# Patient Record
Sex: MALE | Race: WHITE | NOT HISPANIC OR LATINO | Employment: OTHER | URBAN - METROPOLITAN AREA
[De-identification: names, ages, dates, MRNs, and addresses within clinical notes are randomized per-mention and may not be internally consistent; named-entity substitution may affect disease eponyms.]

---

## 2018-11-09 ENCOUNTER — APPOINTMENT (OUTPATIENT)
Dept: GENERAL RADIOLOGY | Facility: HOSPITAL | Age: 28
End: 2018-11-09

## 2018-11-09 ENCOUNTER — HOSPITAL ENCOUNTER (INPATIENT)
Facility: HOSPITAL | Age: 28
LOS: 5 days | Discharge: HOME | End: 2018-11-14
Attending: EMERGENCY MEDICINE | Admitting: SURGERY

## 2018-11-09 DIAGNOSIS — J93.9 PNEUMOTHORAX, LEFT: Primary | ICD-10-CM

## 2018-11-09 LAB
ALBUMIN SERPL-MCNC: 4.7 G/DL (ref 3.5–5.2)
ALBUMIN/GLOB SERPL: 1.4 G/DL
ALP SERPL-CCNC: 68 U/L (ref 40–129)
ALT SERPL W P-5'-P-CCNC: 25 U/L (ref 5–41)
ANION GAP SERPL CALCULATED.3IONS-SCNC: 12.7 MMOL/L
AST SERPL-CCNC: 18 U/L (ref 5–40)
BASOPHILS # BLD AUTO: 0.05 10*3/MM3 (ref 0–0.2)
BASOPHILS NFR BLD AUTO: 0.6 % (ref 0–2)
BILIRUB SERPL-MCNC: 0.4 MG/DL (ref 0.2–1.2)
BUN BLD-MCNC: 12 MG/DL (ref 6–20)
BUN/CREAT SERPL: 11.3 (ref 7–25)
CALCIUM SPEC-SCNC: 9.9 MG/DL (ref 8.6–10.5)
CHLORIDE SERPL-SCNC: 106 MMOL/L (ref 98–107)
CO2 SERPL-SCNC: 23.3 MMOL/L (ref 22–29)
CREAT BLD-MCNC: 1.06 MG/DL (ref 0.76–1.27)
DEPRECATED RDW RBC AUTO: 43.7 FL (ref 37–54)
EOSINOPHIL # BLD AUTO: 0.15 10*3/MM3 (ref 0.1–0.3)
EOSINOPHIL NFR BLD AUTO: 1.9 % (ref 0–4)
ERYTHROCYTE [DISTWIDTH] IN BLOOD BY AUTOMATED COUNT: 12.8 % (ref 11.5–14.5)
GFR SERPL CREATININE-BSD FRML MDRD: 100 ML/MIN/1.73
GFR SERPL CREATININE-BSD FRML MDRD: 83 ML/MIN/1.73
GLOBULIN UR ELPH-MCNC: 3.4 GM/DL
GLUCOSE BLD-MCNC: 84 MG/DL (ref 65–99)
HCT VFR BLD AUTO: 47.5 % (ref 42–52)
HGB BLD-MCNC: 16.1 G/DL (ref 14–18)
IMM GRANULOCYTES # BLD: 0.01 10*3/MM3 (ref 0–0.03)
IMM GRANULOCYTES NFR BLD: 0.1 % (ref 0–0.5)
LYMPHOCYTES # BLD AUTO: 2.82 10*3/MM3 (ref 0.6–4.8)
LYMPHOCYTES NFR BLD AUTO: 35.9 % (ref 20–45)
MCH RBC QN AUTO: 31.5 PG (ref 27–31)
MCHC RBC AUTO-ENTMCNC: 33.9 G/DL (ref 31–37)
MCV RBC AUTO: 93 FL (ref 80–94)
MONOCYTES # BLD AUTO: 0.54 10*3/MM3 (ref 0–1)
MONOCYTES NFR BLD AUTO: 6.9 % (ref 3–8)
NEUTROPHILS # BLD AUTO: 4.28 10*3/MM3 (ref 1.5–8.3)
NEUTROPHILS NFR BLD AUTO: 54.6 % (ref 45–70)
NRBC BLD MANUAL-RTO: 0 /100 WBC (ref 0–0)
PLATELET # BLD AUTO: 194 10*3/MM3 (ref 140–500)
PMV BLD AUTO: 10.9 FL (ref 7.4–10.4)
POTASSIUM BLD-SCNC: 3.8 MMOL/L (ref 3.5–5.2)
PROT SERPL-MCNC: 8.1 G/DL (ref 6–8.5)
RBC # BLD AUTO: 5.11 10*6/MM3 (ref 4.7–6.1)
SODIUM BLD-SCNC: 142 MMOL/L (ref 136–145)
WBC NRBC COR # BLD: 7.85 10*3/MM3 (ref 4.8–10.8)

## 2018-11-09 PROCEDURE — 25010000002 ONDANSETRON PER 1 MG: Performed by: EMERGENCY MEDICINE

## 2018-11-09 PROCEDURE — 71045 X-RAY EXAM CHEST 1 VIEW: CPT

## 2018-11-09 PROCEDURE — 99291 CRITICAL CARE FIRST HOUR: CPT | Performed by: EMERGENCY MEDICINE

## 2018-11-09 PROCEDURE — 25010000002 HYDROMORPHONE PER 4 MG: Performed by: SURGERY

## 2018-11-09 PROCEDURE — 80053 COMPREHEN METABOLIC PANEL: CPT | Performed by: EMERGENCY MEDICINE

## 2018-11-09 PROCEDURE — 25010000002 HYDROMORPHONE PER 4 MG

## 2018-11-09 PROCEDURE — 71046 X-RAY EXAM CHEST 2 VIEWS: CPT

## 2018-11-09 PROCEDURE — 32551 INSERTION OF CHEST TUBE: CPT | Performed by: EMERGENCY MEDICINE

## 2018-11-09 PROCEDURE — 99285 EMERGENCY DEPT VISIT HI MDM: CPT

## 2018-11-09 PROCEDURE — 25010000002 HYDROMORPHONE PER 4 MG: Performed by: EMERGENCY MEDICINE

## 2018-11-09 PROCEDURE — 94799 UNLISTED PULMONARY SVC/PX: CPT

## 2018-11-09 PROCEDURE — 85025 COMPLETE CBC W/AUTO DIFF WBC: CPT | Performed by: EMERGENCY MEDICINE

## 2018-11-09 PROCEDURE — 0W9B30Z DRAINAGE OF LEFT PLEURAL CAVITY WITH DRAINAGE DEVICE, PERCUTANEOUS APPROACH: ICD-10-PCS | Performed by: EMERGENCY MEDICINE

## 2018-11-09 RX ORDER — HYDROMORPHONE HCL 110MG/55ML
1 PATIENT CONTROLLED ANALGESIA SYRINGE INTRAVENOUS ONCE
Status: COMPLETED | OUTPATIENT
Start: 2018-11-09 | End: 2018-11-09

## 2018-11-09 RX ORDER — LIDOCAINE HYDROCHLORIDE 20 MG/ML
INJECTION, SOLUTION INFILTRATION; PERINEURAL
Status: COMPLETED
Start: 2018-11-09 | End: 2018-11-09

## 2018-11-09 RX ORDER — ONDANSETRON 2 MG/ML
4 INJECTION INTRAMUSCULAR; INTRAVENOUS ONCE
Status: COMPLETED | OUTPATIENT
Start: 2018-11-09 | End: 2018-11-09

## 2018-11-09 RX ORDER — LIDOCAINE HYDROCHLORIDE 20 MG/ML
10 INJECTION, SOLUTION INFILTRATION; PERINEURAL ONCE
Status: COMPLETED | OUTPATIENT
Start: 2018-11-09 | End: 2018-11-09

## 2018-11-09 RX ORDER — HYDROMORPHONE HCL 110MG/55ML
PATIENT CONTROLLED ANALGESIA SYRINGE INTRAVENOUS
Status: COMPLETED
Start: 2018-11-09 | End: 2018-11-09

## 2018-11-09 RX ORDER — SODIUM CHLORIDE 9 MG/ML
INJECTION, SOLUTION INTRAVENOUS
Status: COMPLETED
Start: 2018-11-09 | End: 2018-11-09

## 2018-11-09 RX ORDER — OXYCODONE HYDROCHLORIDE AND ACETAMINOPHEN 5; 325 MG/1; MG/1
1 TABLET ORAL EVERY 4 HOURS PRN
Status: DISCONTINUED | OUTPATIENT
Start: 2018-11-09 | End: 2018-11-10

## 2018-11-09 RX ORDER — HYDROMORPHONE HCL 110MG/55ML
0.5 PATIENT CONTROLLED ANALGESIA SYRINGE INTRAVENOUS
Status: DISCONTINUED | OUTPATIENT
Start: 2018-11-09 | End: 2018-11-10

## 2018-11-09 RX ADMIN — LIDOCAINE HYDROCHLORIDE 10 ML: 20 INJECTION, SOLUTION INFILTRATION; PERINEURAL at 19:54

## 2018-11-09 RX ADMIN — METHOHEXITAL SODIUM 50 MG: 500 INJECTION, POWDER, LYOPHILIZED, FOR SOLUTION INTRAMUSCULAR; INTRAVENOUS; RECTAL at 19:34

## 2018-11-09 RX ADMIN — ONDANSETRON 4 MG: 2 INJECTION, SOLUTION INTRAMUSCULAR; INTRAVENOUS at 19:53

## 2018-11-09 RX ADMIN — HYDROMORPHONE HYDROCHLORIDE 1 MG: 2 INJECTION, SOLUTION INTRAMUSCULAR; INTRAVENOUS; SUBCUTANEOUS at 19:36

## 2018-11-09 RX ADMIN — METHOHEXITAL SODIUM 30 MG: 500 INJECTION, POWDER, LYOPHILIZED, FOR SOLUTION INTRAMUSCULAR; INTRAVENOUS; RECTAL at 19:32

## 2018-11-09 RX ADMIN — Medication 1 MG: at 19:36

## 2018-11-09 RX ADMIN — HYDROMORPHONE HYDROCHLORIDE 0.5 MG: 2 INJECTION, SOLUTION INTRAMUSCULAR; INTRAVENOUS; SUBCUTANEOUS at 22:44

## 2018-11-09 RX ADMIN — METHOHEXITAL SODIUM 20 MG: 500 INJECTION, POWDER, LYOPHILIZED, FOR SOLUTION INTRAMUSCULAR; INTRAVENOUS; RECTAL at 19:30

## 2018-11-09 RX ADMIN — HYDROMORPHONE HYDROCHLORIDE 1 MG: 2 INJECTION, SOLUTION INTRAMUSCULAR; INTRAVENOUS; SUBCUTANEOUS at 20:26

## 2018-11-09 RX ADMIN — METHOHEXITAL SODIUM 70 MG: 500 INJECTION, POWDER, LYOPHILIZED, FOR SOLUTION INTRAMUSCULAR; INTRAVENOUS; RECTAL at 19:29

## 2018-11-10 ENCOUNTER — ANESTHESIA EVENT (OUTPATIENT)
Dept: PERIOP | Facility: HOSPITAL | Age: 28
End: 2018-11-10

## 2018-11-10 ENCOUNTER — APPOINTMENT (OUTPATIENT)
Dept: GENERAL RADIOLOGY | Facility: HOSPITAL | Age: 28
End: 2018-11-10

## 2018-11-10 ENCOUNTER — ANESTHESIA (OUTPATIENT)
Dept: PERIOP | Facility: HOSPITAL | Age: 28
End: 2018-11-10

## 2018-11-10 PROBLEM — J93.9 PNEUMOTHORAX, LEFT: Status: ACTIVE | Noted: 2018-11-10

## 2018-11-10 PROCEDURE — 87486 CHLMYD PNEUM DNA AMP PROBE: CPT | Performed by: HOSPITALIST

## 2018-11-10 PROCEDURE — 25010000002 SUCCINYLCHOLINE PER 20 MG: Performed by: NURSE ANESTHETIST, CERTIFIED REGISTERED

## 2018-11-10 PROCEDURE — 99252 IP/OBS CONSLTJ NEW/EST SF 35: CPT | Performed by: HOSPITALIST

## 2018-11-10 PROCEDURE — 25010000002 MIDAZOLAM PER 1 MG: Performed by: NURSE ANESTHETIST, CERTIFIED REGISTERED

## 2018-11-10 PROCEDURE — 32551 INSERTION OF CHEST TUBE: CPT | Performed by: SURGERY

## 2018-11-10 PROCEDURE — 25010000002 ONDANSETRON PER 1 MG: Performed by: NURSE ANESTHETIST, CERTIFIED REGISTERED

## 2018-11-10 PROCEDURE — C1729 CATH, DRAINAGE: HCPCS | Performed by: SURGERY

## 2018-11-10 PROCEDURE — 0W9B00Z DRAINAGE OF LEFT PLEURAL CAVITY WITH DRAINAGE DEVICE, OPEN APPROACH: ICD-10-PCS | Performed by: SURGERY

## 2018-11-10 PROCEDURE — 71046 X-RAY EXAM CHEST 2 VIEWS: CPT

## 2018-11-10 PROCEDURE — 87581 M.PNEUMON DNA AMP PROBE: CPT | Performed by: HOSPITALIST

## 2018-11-10 PROCEDURE — 25010000002 HYDROMORPHONE PER 4 MG: Performed by: SURGERY

## 2018-11-10 PROCEDURE — 63710000001 DIPHENHYDRAMINE PER 50 MG

## 2018-11-10 PROCEDURE — 25010000002 PROPOFOL 10 MG/ML EMULSION: Performed by: NURSE ANESTHETIST, CERTIFIED REGISTERED

## 2018-11-10 PROCEDURE — 94799 UNLISTED PULMONARY SVC/PX: CPT

## 2018-11-10 PROCEDURE — 25010000002 FENTANYL CITRATE (PF) 100 MCG/2ML SOLUTION: Performed by: NURSE ANESTHETIST, CERTIFIED REGISTERED

## 2018-11-10 PROCEDURE — 71045 X-RAY EXAM CHEST 1 VIEW: CPT

## 2018-11-10 PROCEDURE — 87798 DETECT AGENT NOS DNA AMP: CPT | Performed by: HOSPITALIST

## 2018-11-10 PROCEDURE — 99223 1ST HOSP IP/OBS HIGH 75: CPT | Performed by: SURGERY

## 2018-11-10 PROCEDURE — 87633 RESP VIRUS 12-25 TARGETS: CPT | Performed by: HOSPITALIST

## 2018-11-10 PROCEDURE — 25010000002 PROMETHAZINE PER 50 MG: Performed by: SURGERY

## 2018-11-10 PROCEDURE — 25010000002 PROMETHAZINE PER 50 MG

## 2018-11-10 PROCEDURE — 25010000002 HYDROMORPHONE 1 MG/ML SOLUTION: Performed by: NURSE ANESTHETIST, CERTIFIED REGISTERED

## 2018-11-10 RX ORDER — NICOTINE 21 MG/24HR
PATCH, TRANSDERMAL 24 HOURS TRANSDERMAL
Status: DISPENSED
Start: 2018-11-10 | End: 2018-11-11

## 2018-11-10 RX ORDER — NICOTINE 21 MG/24HR
1 PATCH, TRANSDERMAL 24 HOURS TRANSDERMAL
Status: DISCONTINUED | OUTPATIENT
Start: 2018-11-10 | End: 2018-11-14 | Stop reason: HOSPADM

## 2018-11-10 RX ORDER — PROMETHAZINE HYDROCHLORIDE 25 MG/ML
INJECTION, SOLUTION INTRAMUSCULAR; INTRAVENOUS
Status: DISPENSED
Start: 2018-11-10 | End: 2018-11-11

## 2018-11-10 RX ORDER — PROPOFOL 10 MG/ML
VIAL (ML) INTRAVENOUS AS NEEDED
Status: DISCONTINUED | OUTPATIENT
Start: 2018-11-10 | End: 2018-11-10 | Stop reason: SURG

## 2018-11-10 RX ORDER — SODIUM CHLORIDE, SODIUM LACTATE, POTASSIUM CHLORIDE, CALCIUM CHLORIDE 600; 310; 30; 20 MG/100ML; MG/100ML; MG/100ML; MG/100ML
INJECTION, SOLUTION INTRAVENOUS CONTINUOUS PRN
Status: DISCONTINUED | OUTPATIENT
Start: 2018-11-10 | End: 2018-11-10 | Stop reason: SURG

## 2018-11-10 RX ORDER — MAGNESIUM HYDROXIDE 1200 MG/15ML
LIQUID ORAL AS NEEDED
Status: DISCONTINUED | OUTPATIENT
Start: 2018-11-10 | End: 2018-11-10 | Stop reason: HOSPADM

## 2018-11-10 RX ORDER — SODIUM CHLORIDE 0.9 % (FLUSH) 0.9 %
3 SYRINGE (ML) INJECTION EVERY 12 HOURS SCHEDULED
Status: DISCONTINUED | OUTPATIENT
Start: 2018-11-10 | End: 2018-11-14 | Stop reason: HOSPADM

## 2018-11-10 RX ORDER — SUCCINYLCHOLINE CHLORIDE 20 MG/ML
INJECTION INTRAMUSCULAR; INTRAVENOUS AS NEEDED
Status: DISCONTINUED | OUTPATIENT
Start: 2018-11-10 | End: 2018-11-10 | Stop reason: SURG

## 2018-11-10 RX ORDER — LIDOCAINE HYDROCHLORIDE 20 MG/ML
INJECTION, SOLUTION INFILTRATION; PERINEURAL AS NEEDED
Status: DISCONTINUED | OUTPATIENT
Start: 2018-11-10 | End: 2018-11-10 | Stop reason: SURG

## 2018-11-10 RX ORDER — ONDANSETRON 2 MG/ML
4 INJECTION INTRAMUSCULAR; INTRAVENOUS ONCE AS NEEDED
Status: DISCONTINUED | OUTPATIENT
Start: 2018-11-10 | End: 2018-11-10

## 2018-11-10 RX ORDER — FENTANYL CITRATE 50 UG/ML
INJECTION, SOLUTION INTRAMUSCULAR; INTRAVENOUS AS NEEDED
Status: DISCONTINUED | OUTPATIENT
Start: 2018-11-10 | End: 2018-11-10 | Stop reason: SURG

## 2018-11-10 RX ORDER — OXYCODONE HYDROCHLORIDE AND ACETAMINOPHEN 5; 325 MG/1; MG/1
1 TABLET ORAL EVERY 4 HOURS PRN
Status: DISCONTINUED | OUTPATIENT
Start: 2018-11-10 | End: 2018-11-10

## 2018-11-10 RX ORDER — HYDROMORPHONE HCL 110MG/55ML
1 PATIENT CONTROLLED ANALGESIA SYRINGE INTRAVENOUS
Status: DISCONTINUED | OUTPATIENT
Start: 2018-11-10 | End: 2018-11-13

## 2018-11-10 RX ORDER — HYDROMORPHONE HCL 110MG/55ML
0.5 PATIENT CONTROLLED ANALGESIA SYRINGE INTRAVENOUS
Status: DISCONTINUED | OUTPATIENT
Start: 2018-11-10 | End: 2018-11-13

## 2018-11-10 RX ORDER — SODIUM CHLORIDE 0.9 % (FLUSH) 0.9 %
3-10 SYRINGE (ML) INJECTION AS NEEDED
Status: DISCONTINUED | OUTPATIENT
Start: 2018-11-10 | End: 2018-11-14 | Stop reason: HOSPADM

## 2018-11-10 RX ORDER — SODIUM CHLORIDE, SODIUM LACTATE, POTASSIUM CHLORIDE, CALCIUM CHLORIDE 600; 310; 30; 20 MG/100ML; MG/100ML; MG/100ML; MG/100ML
100 INJECTION, SOLUTION INTRAVENOUS CONTINUOUS
Status: DISCONTINUED | OUTPATIENT
Start: 2018-11-10 | End: 2018-11-11

## 2018-11-10 RX ORDER — PROMETHAZINE HYDROCHLORIDE 25 MG/ML
INJECTION, SOLUTION INTRAMUSCULAR; INTRAVENOUS
Status: COMPLETED
Start: 2018-11-10 | End: 2018-11-10

## 2018-11-10 RX ORDER — HYDROMORPHONE HCL 110MG/55ML
2 PATIENT CONTROLLED ANALGESIA SYRINGE INTRAVENOUS
Status: DISCONTINUED | OUTPATIENT
Start: 2018-11-10 | End: 2018-11-13

## 2018-11-10 RX ORDER — MIDAZOLAM HYDROCHLORIDE 1 MG/ML
INJECTION INTRAMUSCULAR; INTRAVENOUS AS NEEDED
Status: DISCONTINUED | OUTPATIENT
Start: 2018-11-10 | End: 2018-11-10 | Stop reason: SURG

## 2018-11-10 RX ORDER — OXYCODONE HYDROCHLORIDE AND ACETAMINOPHEN 5; 325 MG/1; MG/1
2 TABLET ORAL EVERY 4 HOURS PRN
Status: DISCONTINUED | OUTPATIENT
Start: 2018-11-10 | End: 2018-11-14 | Stop reason: HOSPADM

## 2018-11-10 RX ORDER — OXYCODONE HYDROCHLORIDE AND ACETAMINOPHEN 5; 325 MG/1; MG/1
1 TABLET ORAL ONCE
Status: COMPLETED | OUTPATIENT
Start: 2018-11-10 | End: 2018-11-10

## 2018-11-10 RX ORDER — OXYCODONE HYDROCHLORIDE AND ACETAMINOPHEN 5; 325 MG/1; MG/1
1 TABLET ORAL EVERY 4 HOURS PRN
Status: DISCONTINUED | OUTPATIENT
Start: 2018-11-10 | End: 2018-11-14 | Stop reason: HOSPADM

## 2018-11-10 RX ORDER — ONDANSETRON 2 MG/ML
INJECTION INTRAMUSCULAR; INTRAVENOUS AS NEEDED
Status: DISCONTINUED | OUTPATIENT
Start: 2018-11-10 | End: 2018-11-10 | Stop reason: SURG

## 2018-11-10 RX ORDER — DIPHENHYDRAMINE HCL 25 MG
CAPSULE ORAL
Status: COMPLETED
Start: 2018-11-10 | End: 2018-11-10

## 2018-11-10 RX ADMIN — PROMETHAZINE HYDROCHLORIDE 12.5 MG: 25 INJECTION INTRAMUSCULAR; INTRAVENOUS at 23:08

## 2018-11-10 RX ADMIN — SUCCINYLCHOLINE CHLORIDE 200 MG: 20 INJECTION, SOLUTION INTRAMUSCULAR; INTRAVENOUS at 10:54

## 2018-11-10 RX ADMIN — OXYCODONE HYDROCHLORIDE AND ACETAMINOPHEN 1 TABLET: 5; 325 TABLET ORAL at 18:41

## 2018-11-10 RX ADMIN — HYDROMORPHONE HYDROCHLORIDE 1 MG: 2 INJECTION, SOLUTION INTRAMUSCULAR; INTRAVENOUS; SUBCUTANEOUS at 14:30

## 2018-11-10 RX ADMIN — OXYCODONE HYDROCHLORIDE AND ACETAMINOPHEN 2 TABLET: 5; 325 TABLET ORAL at 22:33

## 2018-11-10 RX ADMIN — SODIUM CHLORIDE, PRESERVATIVE FREE 12.5 MG: 5 INJECTION INTRAVENOUS at 18:32

## 2018-11-10 RX ADMIN — NICOTINE 1 PATCH: 21 PATCH TRANSDERMAL at 16:31

## 2018-11-10 RX ADMIN — HYDROMORPHONE HYDROCHLORIDE 0.5 MG: 2 INJECTION, SOLUTION INTRAMUSCULAR; INTRAVENOUS; SUBCUTANEOUS at 03:57

## 2018-11-10 RX ADMIN — FENTANYL CITRATE 100 MCG: 50 INJECTION, SOLUTION INTRAMUSCULAR; INTRAVENOUS at 10:49

## 2018-11-10 RX ADMIN — HYDROMORPHONE HYDROCHLORIDE 0.5 MG: 2 INJECTION, SOLUTION INTRAMUSCULAR; INTRAVENOUS; SUBCUTANEOUS at 00:42

## 2018-11-10 RX ADMIN — SODIUM CHLORIDE, POTASSIUM CHLORIDE, SODIUM LACTATE AND CALCIUM CHLORIDE 100 ML/HR: 600; 310; 30; 20 INJECTION, SOLUTION INTRAVENOUS at 12:42

## 2018-11-10 RX ADMIN — MIDAZOLAM HYDROCHLORIDE 2 MG: 1 INJECTION, SOLUTION INTRAMUSCULAR; INTRAVENOUS at 10:44

## 2018-11-10 RX ADMIN — HYDROMORPHONE HYDROCHLORIDE 1 MG: 2 INJECTION, SOLUTION INTRAMUSCULAR; INTRAVENOUS; SUBCUTANEOUS at 21:21

## 2018-11-10 RX ADMIN — DIPHENHYDRAMINE HYDROCHLORIDE 25 MG: 25 CAPSULE ORAL at 21:21

## 2018-11-10 RX ADMIN — SODIUM CHLORIDE, POTASSIUM CHLORIDE, SODIUM LACTATE AND CALCIUM CHLORIDE 100 ML/HR: 600; 310; 30; 20 INJECTION, SOLUTION INTRAVENOUS at 16:05

## 2018-11-10 RX ADMIN — FENTANYL CITRATE 100 MCG: 50 INJECTION, SOLUTION INTRAMUSCULAR; INTRAVENOUS at 11:49

## 2018-11-10 RX ADMIN — SODIUM CHLORIDE, PRESERVATIVE FREE 12.5 MG: 5 INJECTION INTRAVENOUS at 22:30

## 2018-11-10 RX ADMIN — HYDROMORPHONE HYDROCHLORIDE 1 MG: 2 INJECTION, SOLUTION INTRAMUSCULAR; INTRAVENOUS; SUBCUTANEOUS at 17:03

## 2018-11-10 RX ADMIN — OXYCODONE HYDROCHLORIDE AND ACETAMINOPHEN 1 TABLET: 5; 325 TABLET ORAL at 07:56

## 2018-11-10 RX ADMIN — ONDANSETRON 4 MG: 2 INJECTION, SOLUTION INTRAMUSCULAR; INTRAVENOUS at 10:44

## 2018-11-10 RX ADMIN — HYDROMORPHONE HYDROCHLORIDE 1 MG: 2 INJECTION, SOLUTION INTRAMUSCULAR; INTRAVENOUS; SUBCUTANEOUS at 19:47

## 2018-11-10 RX ADMIN — OXYCODONE HYDROCHLORIDE AND ACETAMINOPHEN 1 TABLET: 5; 325 TABLET ORAL at 16:04

## 2018-11-10 RX ADMIN — HYDROMORPHONE HYDROCHLORIDE 0.5 MG: 2 INJECTION, SOLUTION INTRAMUSCULAR; INTRAVENOUS; SUBCUTANEOUS at 06:54

## 2018-11-10 RX ADMIN — LIDOCAINE HYDROCHLORIDE 100 MG: 20 INJECTION, SOLUTION INFILTRATION; PERINEURAL at 10:49

## 2018-11-10 RX ADMIN — HYDROMORPHONE HYDROCHLORIDE 0.5 MG: 1 INJECTION, SOLUTION INTRAMUSCULAR; INTRAVENOUS; SUBCUTANEOUS at 12:00

## 2018-11-10 RX ADMIN — PROPOFOL 150 MG: 10 INJECTION, EMULSION INTRAVENOUS at 10:54

## 2018-11-10 RX ADMIN — Medication 3 ML: at 14:30

## 2018-11-10 RX ADMIN — SODIUM CHLORIDE, POTASSIUM CHLORIDE, SODIUM LACTATE AND CALCIUM CHLORIDE: 600; 310; 30; 20 INJECTION, SOLUTION INTRAVENOUS at 10:47

## 2018-11-10 NOTE — H&P
"Left chest pain  History of present illness this 28-year-old white male awoke yesterday with left-sided chest pain and he was unable to lay flat.  He came to the emergency room was found to have a left pneumothorax.  Chest tube was placed by the emergency room doctor and I was asked to admit him.  Initially the chest film showed no nearly complete reexpansion with a 5% residual but now it shows an increasing left-sided pneumothorax.  Denies prior history of pneumothorax.  He smokes a pack of cigarettes per day.   past medical history significant for prior history of asthma.  He has had dental procedures in the past.  He denies any medications at home.  He denies any allergies to medications.  Social history drinks alcohol socially and he smokes a pack of cigarettes per day  Family history significant for diabetes  There are no disabilities  Review of systems is otherwise negative  Physical exam the cell alert white male in no active distress.  He has bilateral wheezing.  His left Pleur-evac shows a continuous air leak.  Heart shows regular rate and rhythm his abdomen is soft and nontender his lab values are normal his chest x-ray shows a 10% left-sided pneumothorax with the tube having shifted from its prior position it is now more laterally.  The chest tube is not sutured to the chest wall.  /96 (BP Location: Right arm)   Pulse 60   Temp 97.4 °F (36.3 °C) (Oral)   Resp 16   Ht 175.3 cm (69\")   Wt 81.6 kg (180 lb)   SpO2 98%   BMI 26.58 kg/m²     Assessment left-sided spontaneous pneumothorax with chest tube malposition  Plan the risks benefits and options were discussed with the patient in detail we will proceed with replacement of his left chest tube.  "

## 2018-11-10 NOTE — ANESTHESIA PROCEDURE NOTES
ANESTHESIA INTUBATION  Urgency: elective    Date/Time: 11/10/2018 10:51 AM  End Time:11/10/2018 10:51 AM  Airway not difficult    General Information and Staff    Patient location during procedure: OR  CRNA: Kimber Renteria CRNA    Indications and Patient Condition  Indications for airway management: airway protection    Preoxygenated: yes  MILS maintained throughout  Mask difficulty assessment: 0 - not attempted (RSI FOR EMERGENT CASE)    Final Airway Details  Final airway type: endotracheal airway      Successful airway: ETT  Cuffed: yes   Successful intubation technique: direct laryngoscopy  Facilitating devices/methods: intubating stylet and cricoid pressure  Endotracheal tube insertion site: oral  Blade: Briceño  Blade size: 2  ETT size (mm): 7.5  Cormack-Lehane Classification: grade I - full view of glottis  Placement verified by: chest auscultation and capnometry   Measured from: lips  ETT to lips (cm): 22  Number of attempts at approach: 1    Additional Comments  BEBS, +ETCO2, VSS. TEETH AND GUMS AS PRE-OP.

## 2018-11-10 NOTE — ED NOTES
Dr Rico, Liane Alvarez RT, Nikki Mgchee ERT, Kemi Campbell ERT, Amirah Ivey RN, Shelia Keane RN at bedside.       Shelia Montiel, RN  11/09/18 1959

## 2018-11-10 NOTE — NURSING NOTE
"Discharge Planning Assessment   [facility]     Patient Name: Santy Maxwell  MRN: [id number]  Today's Date: 11/10/2018    Admit Date: 11/9/2018    Discharge Needs Assessment     Row Name 11/10/18 9636       Living Environment    Lives With  significant other;child(terrance), dependent;grandparent(s)    Current Living Arrangements  home/apartment/condo    Primary Care Provided by  self    Provides Primary Care For  child(terrance)    Family Caregiver if Needed  significant other    Quality of Family Relationships  helpful;involved;supportive       Resource/Environmental Concerns    Resource/Environmental Concerns  financial    Financial Concerns  insurance, none    Transportation Concerns  car, none       Transition Planning    Patient/Family Anticipates Transition to  home with family    Patient/Family Anticipated Services at Transition  none    Transportation Anticipated  car, drives self;family or friend will provide       Discharge Needs Assessment    Readmission Within the Last 30 Days  no previous admission in last 30 days    Concerns to be Addressed  financial/insurance    Equipment Currently Used at Home  none    Anticipated Changes Related to Illness  inability to work    Equipment Needed After Discharge  none    Offered/Gave Vendor List  other (see comments) PCP list given        Discharge Plan     Row Name 11/10/18 5585       Plan    Plan  home    Patient/Family in Agreement with Plan  yes    Plan Comments  spoke with patient at bedside. agreeable to speak to . he lives at home with sig other, grandmother & 4 children (ages 9,5,4,1). they live in a one level modular home. no HH, DME or home O2/neb. independent with ADL's including shopping, meal prep and driving. he works full time but describes it as \"self employed\". he does not have medical insurance. LVM for [facility]-med assist r/t self pay. will use [facility] if any medications are needed. he does not have prescription coverage and will need meds " on low cost list. does not have a living will: pamphlet given and cosult placed for [facility]. no PCP listed and list of providers given. face sheet verified. plan is home when medically stable. will continue to follow.         Destination      No service coordination in this encounter.      Durable Medical Equipment      No service coordination in this encounter.      Dialysis/Infusion      No service coordination in this encounter.      Home Medical Care      No service coordination in this encounter.      Community Resources      No service coordination in this encounter.          Demographic Summary     Row Name 11/10/18 0948       General Information    Admission Type  inpatient    Arrived From  home    Referral Source  admission list    Reason for Consult  discharge planning    Preferred Language  English     Used During This Interaction  no       Contact Information    Permission Granted to Share Info With          Functional Status    No documentation.       Psychosocial    No documentation.       Abuse/Neglect    No documentation.       Legal    No documentation.       Substance Abuse    No documentation.       Patient Forms    No documentation.           Emely Chris RN

## 2018-11-10 NOTE — PLAN OF CARE
Problem: Chest Tube Drainage Device (Adult)  Goal: Signs and Symptoms of Listed Potential Problems Will be Absent, Minimized or Managed (Chest Tube Drainage Device)  Outcome: Ongoing (interventions implemented as appropriate)   11/10/18 8778   Goal/Outcome Evaluation   Problems Assessed (Chest Tube Drainage Device) infection;pain

## 2018-11-10 NOTE — PLAN OF CARE
Problem: Patient Care Overview  Goal: Plan of Care Review  Outcome: Outcome(s) achieved Date Met: 11/10/18   11/10/18 9540   Coping/Psychosocial   Plan of Care Reviewed With patient   Plan of Care Review   Progress improving   OTHER   Outcome Summary Pt had new chest tube placed today by Dr Prabhakar-continues on LWS (SEE ORDER), prn pain medicine administerd as needed, encourage pt to ambulate and cough and deep breathe, will continue to monitor pt for changes

## 2018-11-10 NOTE — ED NOTES
Pt moved to trauma room to prepare for chest tube insertion.  Consent signed.     Shelia Montiel RN  11/09/18 9074

## 2018-11-10 NOTE — PLAN OF CARE
Problem: Patient Care Overview  Goal: Discharge Needs Assessment  Outcome: Ongoing (interventions implemented as appropriate)   11/10/18 0949   Discharge Needs Assessment   Readmission Within the Last 30 Days no previous admission in last 30 days   Concerns to be Addressed financial/insurance   Patient/Family Anticipates Transition to home with family   Patient/Family Anticipated Services at Transition none   Transportation Concerns car, none   Transportation Anticipated car, drives self;family or friend will provide   Anticipated Changes Related to Illness inability to work   Equipment Needed After Discharge none   Offered/Gave Vendor List other (see comments)  (PCP list given)   Disability   Equipment Currently Used at Home none

## 2018-11-10 NOTE — PLAN OF CARE
Problem: Pain, Acute (Adult)  Goal: Identify Related Risk Factors and Signs and Symptoms  Outcome: Outcome(s) achieved Date Met: 11/10/18   11/10/18 8476   Pain, Acute (Adult)   Related Risk Factors (Acute Pain) positioning;procedure/treatment;patient perception   Signs and Symptoms (Acute Pain) verbalization of pain descriptors

## 2018-11-10 NOTE — PLAN OF CARE
Problem: Infection, Risk/Actual (Adult)  Goal: Identify Related Risk Factors and Signs and Symptoms  Outcome: Outcome(s) achieved Date Met: 11/10/18    Goal: Infection Prevention/Resolution  Outcome: Ongoing (interventions implemented as appropriate)   11/10/18 0453   Infection, Risk/Actual (Adult)   Infection Prevention/Resolution making progress toward outcome

## 2018-11-10 NOTE — OP NOTE
Preoperative diagnosis left pneumothorax with malpositioned chest tube  Postoperative diagnosis is same  Procedure insertion of left chest tube  Complications none  Drains none  Specimen none  Estimated blood loss less than 5 cc  Anesthesia Gen. via endotracheal tube  Surgeon Dr. Prabhakar  Assistant none  Findings left chest tube appeared to be well positioned at the conclusion on emergence from anesthesia the patient became somewhat combative that chest tube dressing was taken down and appeared to be intact but we will check another chest x-ray.  Procedure after satisfactory induction of general anesthesia via endotracheal tube the patient's left chest was prepped and draped in sterile fashion.  His prior chest tube had come out completely.  Incision is made in the anterior axillary line overlying the sixth rib and skin subcutaneous tissue was divided hemostasis was assured Lauren clamp was passed over the top the rib and into the pleural cavity 20 Czech chest tube was passed into the left chest.  There was condensation on the inside of the tube confirming the tube within the left pleural space.  It was sutured in place.  He was hooked to a Pleur-evac.  Chest x-ray in the operating room  showed that the tube could be directed more towards the apex so the tube was subsequently readjusted  final chest x-ray showed the tube be in acceptable position.    The incision for this insertion was closed in interrupted simple fashion with use of 0 silk and the chest tube was sutured in the chest wall with 0 silk.  The previous chest tube insertion site was closed in interrupted simple fashion with use of 2-0 nylon.  Wound was cleaned and dried and sterilely dressed with Vaseline gauze 4 x 4's and tape, all connections were secured the chest tube was hooked to low continuous wall suction 20 cm underwater seal .upon emergence from anesthesia he became somewhat combative.  The chest tube dressing was taken down and the chest tube did  not appear to have moved it was redressed and chest x-ray will be obtained in the recovery room.  That chest x-ray is pending.  He tolerated the procedure well and was transferred to the recovery room in stable condition.

## 2018-11-10 NOTE — ED PROVIDER NOTES
Subjective   History of Present Illness  History of Present Illness    Chief complaint: Shortness of breath and left-sided chest pain    Location: Entire left chest    Quality/Severity:  Moderate    Timing/Duration: Symptoms began this morning    Modifying Factors: Symptoms started after a coughing spell    Associated Symptoms: Anxious    Narrative: The patient is a 28-year-old white male who presents as noted above.  The patient relates that after getting up this morning he did have a coughing spell.  Subsequently the patient began to develop some left-sided chest pain along with increasing shortness of breath.  Symptoms are now much worse.    Review of Systems   Unable to perform ROS: Acuity of condition (Limited due to acuity of condition.)   Respiratory: Positive for cough and shortness of breath.    Cardiovascular: Positive for chest pain (left side).       Past Medical History:   Diagnosis Date   • Asthma    • Hypertension        No Known Allergies    Past Surgical History:   Procedure Laterality Date   • DENTAL PROCEDURE         History reviewed. No pertinent family history.    Social History     Social History   • Marital status: Significant Other     Social History Main Topics   • Smoking status: Heavy Tobacco Smoker     Packs/day: 1.00     Types: Cigarettes   • Alcohol use No   • Drug use: No     Other Topics Concern   • Not on file           Objective   Physical Exam   Constitutional: He is oriented to person, place, and time.   The patient is a healthy-appearing, 28-year-old white male who is in significant distress due to shortness of breath and pain.   HENT:   Head: Normocephalic and atraumatic.   Eyes: Conjunctivae and EOM are normal.   Cardiovascular: Normal rate, regular rhythm and normal heart sounds.    Pulmonary/Chest:   Tachypnea present.  Auscultation of the lungs reveals markedly decreased breath sounds on the left side.  The left chest is tender to palpation.   Abdominal: Soft. There is no  tenderness.   Neurological: He is alert and oriented to person, place, and time.   Psychiatric:   Patient very anxious and tremulous.   Nursing note and vitals reviewed.      Chest Tube Insertion  Date/Time: 11/9/2018 7:55 PM  Performed by: PAL RICO  Authorized by: PAL RICO     Consent:     Consent obtained:  Verbal and written    Consent given by:  Patient    Risks discussed:  Bleeding, pain and infection  Pre-procedure details:     Skin preparation:  Betadine  Sedation:     Sedation type:  Moderate (conscious) sedation  Anesthesia (see MAR for exact dosages):     Anesthesia method:  Local infiltration    Local anesthetic:  Lidocaine 2% w/o epi  Procedure details:     Placement location:  L lateral    Scalpel size:  10    Tube size (Fr):  12    Dissection instrument:  Finger    Ultrasound guidance: no      Tension pneumothorax: no      Tube connected to:  Water seal    Drainage characteristics:  Air only    Dressing:  4x4 sterile gauze and petrolatum-impregnated gauze  Post-procedure details:     Post-insertion x-ray findings: tube in good position      Patient tolerance of procedure:  Tolerated well, no immediate complications  Comments:      See nurses notes for medications.  Chest tube insertion was felt to be emergent.  Some mild difficulty was experienced in achieving adequate sedation.               ED Course  ED Course as of Nov 09 2037 Fri Nov 09, 2018   1916 Large pneumothorax with some mediastinal shift noted on chest x-ray.  Findings discussed with patient and he is agreeable to chest tube insertion.  Preparations for procedure in process.  [ML]   2034 Case and findings discussed with the general surgeon, Dr. Prabhakar, who did agree to admit this patient for further treatment of his left-sided spontaneous pneumothorax.  Patient in agreement with treatment plan.  [ML]      ED Course User Index  [ML] Pal Rico MD                  [facility]  Number of Diagnoses or Management  Options  Pneumothorax, left: new and requires workup     Amount and/or Complexity of Data Reviewed  Clinical lab tests: reviewed and ordered  Tests in the radiology section of CPT®: ordered and reviewed  Discuss the patient with other providers: yes  Independent visualization of images, tracings, or specimens: yes    Risk of Complications, Morbidity, and/or Mortality  Presenting problems: high  Diagnostic procedures: high  Management options: high    Critical Care  Total time providing critical care: 30-74 minutes    Labs this visit  Lab Results (last 24 hours)     Procedure Component Value Units Date/Time    CBC & Differential [[id number]] Collected:  11/09/18 1848    Specimen:  Blood Updated:  11/09/18 1902    Narrative:       The following orders were created for panel order CBC & Differential.  Procedure                               Abnormality         Status                     ---------                               -----------         ------                     CBC Auto Differential[[id number]]        Abnormal            Final result                 Please view results for these tests on the individual orders.    Comprehensive Metabolic Panel [[id number]] Collected:  11/09/18 1848    Specimen:  Blood Updated:  11/09/18 1918     Glucose 84 mg/dL      BUN 12 mg/dL      Creatinine 1.06 mg/dL      Sodium 142 mmol/L      Potassium 3.8 mmol/L      Chloride 106 mmol/L      CO2 23.3 mmol/L      Calcium 9.9 mg/dL      Total Protein 8.1 g/dL      Albumin 4.70 g/dL      ALT (SGPT) 25 U/L      AST (SGOT) 18 U/L      Alkaline Phosphatase 68 U/L      Total Bilirubin 0.4 mg/dL      eGFR Non African Amer 83 mL/min/1.73      eGFR  African Amer 100 mL/min/1.73      Globulin 3.4 gm/dL      A/G Ratio 1.4 g/dL      BUN/Creatinine Ratio 11.3     Anion Gap 12.7 mmol/L     CBC Auto Differential [[id number]]  (Abnormal) Collected:  11/09/18 1848    Specimen:  Blood Updated:  11/09/18 1902     WBC 7.85 10*3/mm3      RBC 5.11 10*6/mm3       Hemoglobin 16.1 g/dL      Hematocrit 47.5 %      MCV 93.0 fL      MCH 31.5 (H) pg      MCHC 33.9 g/dL      RDW 12.8 %      RDW-SD 43.7 fl      MPV 10.9 (H) fL      Platelets 194 10*3/mm3      Neutrophil % 54.6 %      Lymphocyte % 35.9 %      Monocyte % 6.9 %      Eosinophil % 1.9 %      Basophil % 0.6 %      Immature Grans % 0.1 %      Neutrophils, Absolute 4.28 10*3/mm3      Lymphocytes, Absolute 2.82 10*3/mm3      Monocytes, Absolute 0.54 10*3/mm3      Eosinophils, Absolute 0.15 10*3/mm3      Basophils, Absolute 0.05 10*3/mm3      Immature Grans, Absolute 0.01 10*3/mm3      nRBC 0.0 /100 WBC         Prescribed on discharge             Medication List      No changes were made to your prescriptions during this visit.       All lab results, imaging results and other tests were reviewed by Kong Rico MD and unless otherwise specified were found to be unremarkable.      Final diagnoses:   Pneumothorax, left         Final diagnoses:   Pneumothorax, left          Kong Rico MD  11/09/18 2039

## 2018-11-10 NOTE — CONSULTS
"[facility] HOSPITALIST     Provider, No Known    CHIEF COMPLAINT: pneumothorax and pain left chest wall/ Managed by surgery    Asthma and history of wheezing/  Will monitor    HISTORY OF PRESENT ILLNESS:    Patient tells us that he woke up with chest pain and it would not get better.  At first he thought it may be his heart but he thought the pain was too sharp and hurt too bad.  So he came to the ER and pneumothorax was diagnosed.  Patient does not have a respiratory infection but he has been sneezing and had nasal congestion.   He is a smoker and we discussed how important it is for him to quit smoking.    He also has hypertension.  His blood pressure presently is 139/97.  That is much better.  Earlier it was 162/120 but I feel pain was driving a lot of the elevation in blood pressure.  We will monitor      Past Medical History:   Diagnosis Date   • Asthma    • Hypertension      Past Surgical History:   Procedure Laterality Date   • DENTAL PROCEDURE       History reviewed. No pertinent family history.  Social History     Tobacco Use   • Smoking status: Heavy Tobacco Smoker     Packs/day: 1.00     Types: Cigarettes   • Smokeless tobacco: Former User     Types: Chew   Substance Use Topics   • Alcohol use: No   • Drug use: No     No medications prior to admission.     Allergies:  Patient has no known allergies.    REVIEW OF SYSTEMS:  Please see the above history of present illness for pertinent positives and negatives.  The remainder of the patient's systems have been reviewed and are negative.     Vital Signs  Temp:  [97.4 °F (36.3 °C)-98.2 °F (36.8 °C)] 98.2 °F (36.8 °C)  Heart Rate:  [57-93] 60  Resp:  [14-28] 16  BP: (131-191)/() 143/96  Oxygen Therapy  SpO2: 99 %  Pulse Oximetry Type: Continuous  Device (Oxygen Therapy): nasal cannula  Flow (L/min): 2  ETCO2 (mmHg): 32 mmHg}  Body mass index is 26.57 kg/m².  Flowsheet Rows      First Filed Value   Admission Height  175.3 cm (69\") " Documented at 11/09/2018 1835   Admission Weight  81.6 kg (180 lb) Documented at 11/09/2018 1835        Body mass index is 26.57 kg/m².       Physical Exam:  Physical Exam   Constitutional: Patient appears well-developed and well-nourished and in no acute distress   HEENT:   Head: Normocephalic and atraumatic.   Eyes:  Pupils are equal, round, and reactive to light. EOM are intact. Sclerae are anicteric and noninjected.  Mouth and Throat: Patient has moist mucous membranes. Oropharynx is clear of any erythema or exudate.     Neck: Neck supple. No JVD present. No thyromegaly present. No lymphadenopathy present.  Cardiovascular: Regular rate, regular rhythm, S1 normal and S2 normal.  Exam reveals no gallop and no friction rub.  No murmur heard.  Pulmonary/Chest: Lungs are clear to auscultation bilaterally. No respiratory distress. No wheezes. No rhonchi. No rales. Decreased breath sounds on left but the patient is splinting secondary to pain from the chest tube.  Abdominal: Soft. Bowel sounds are normal. No distension and no mass. There is no hepatosplenomegaly. There is no tenderness.   Musculoskeletal: Normal muscle tone  Extremities: No edema. Pulses are palpable in all 4 extremities.  Neurological: Patient is alert and oriented to person, place, and time. Cranial nerves II-XII are grossly intact with no focal deficits.  Skin: Skin is warm. No rash noted. Nails show no clubbing.  No cyanosis or erythema.    Emotional Behavior:    Judgement and Insight: good   Mental Status:  Alertness  good   Memory:  good   Mood and Affect:         Depression  None apparent               Anxiety  High level now with pain and chest tube insertion    Debilities:   Physical Weakness  none   Handicaps  none   Disabilities  none   Agitation  Maybe some during my visit secondary to chest tube and pain     Results Review:    I reviewed the patient's new clinical results.  Lab Results (most recent)     Procedure Component Value Units  Date/Time    Comprehensive Metabolic Panel [[id number]] Collected:  11/09/18 1848    Specimen:  Blood Updated:  11/09/18 1918     Glucose 84 mg/dL      BUN 12 mg/dL      Creatinine 1.06 mg/dL      Sodium 142 mmol/L      Potassium 3.8 mmol/L      Chloride 106 mmol/L      CO2 23.3 mmol/L      Calcium 9.9 mg/dL      Total Protein 8.1 g/dL      Albumin 4.70 g/dL      ALT (SGPT) 25 U/L      AST (SGOT) 18 U/L      Alkaline Phosphatase 68 U/L      Total Bilirubin 0.4 mg/dL      eGFR Non African Amer 83 mL/min/1.73      eGFR  African Amer 100 mL/min/1.73      Globulin 3.4 gm/dL      A/G Ratio 1.4 g/dL      BUN/Creatinine Ratio 11.3     Anion Gap 12.7 mmol/L     CBC & Differential [[id number]] Collected:  11/09/18 1848    Specimen:  Blood Updated:  11/09/18 1902    Narrative:       The following orders were created for panel order CBC & Differential.  Procedure                               Abnormality         Status                     ---------                               -----------         ------                     CBC Auto Differential[[id number]]        Abnormal            Final result                 Please view results for these tests on the individual orders.    CBC Auto Differential [[id number]]  (Abnormal) Collected:  11/09/18 1848    Specimen:  Blood Updated:  11/09/18 1902     WBC 7.85 10*3/mm3      RBC 5.11 10*6/mm3      Hemoglobin 16.1 g/dL      Hematocrit 47.5 %      MCV 93.0 fL      MCH 31.5 pg      MCHC 33.9 g/dL      RDW 12.8 %      RDW-SD 43.7 fl      MPV 10.9 fL      Platelets 194 10*3/mm3      Neutrophil % 54.6 %      Lymphocyte % 35.9 %      Monocyte % 6.9 %      Eosinophil % 1.9 %      Basophil % 0.6 %      Immature Grans % 0.1 %      Neutrophils, Absolute 4.28 10*3/mm3      Lymphocytes, Absolute 2.82 10*3/mm3      Monocytes, Absolute 0.54 10*3/mm3      Eosinophils, Absolute 0.15 10*3/mm3      Basophils, Absolute 0.05 10*3/mm3      Immature Grans, Absolute 0.01 10*3/mm3      nRBC 0.0 /100 WBC            Imaging Results (most recent)     Procedure Component Value Units Date/Time    XR Chest 1 View [[id number]] Updated:  11/10/18 1200    XR Chest 2 View [[id number]] Collected:  11/10/18 0721     Updated:  11/10/18 0725    Narrative:       CHEST PA AND LATERAL 11/10/2018     HISTORY:   Follow-up left-sided pneumothorax status post chest tube placement on  11/09/2018. Patient awoke 11/09/2018 with left-sided chest pain and  shortness of breath. Smoking history.     FINDINGS:  The heart is normal in size. The left pleural tube tip is now located  along the lateral aspect of the left upper lobe. There is been a slight  increase in the left-sided pneumothorax, now graded at 10%. The lungs  are emphysematous. There are no pleural effusions.       Impression:       Slight increase in the left apical pneumothorax compared  with 11/09/2018 at 1950, now graded at 10%.     This report was finalized on 11/10/2018 7:23 AM by Dr. Christ Guevara MD.       XR Chest 1 View [[id number]] Collected:  11/10/18 0719     Updated:  11/10/18 0723    Narrative:       CHEST ONE VIEW 11/09/2018 AT 1950     HISTORY:   Patient awoke on 11/09/2018 with left-sided chest pain and shortness of  breath. Previous chest radiograph earlier today showed large left-sided  pneumothorax. Post invasive procedure, chest tube placement today.     FINDINGS:  The heart is normal in size. Left pleural tube is been inserted with the  tip in the left upper lobe region with reexpansion of the left lung.  Only trace left apical pneumothorax persists, less than 5%. No airspace  consolidation is seen. There are no pleural effusions.       Impression:       Interval placement of left pleural tube with near-complete  resolution of the previously noted large left pneumothorax.     This report was finalized on 11/10/2018 7:21 AM by Dr. Christ Guevara MD.       XR Chest 2 View [[id number]] Collected:  11/10/18 0715     Updated:  11/10/18 0721    Narrative:       CHEST PA AND  LATERAL 11/09/2018 AT 1900     HISTORY:   Patient awoke with left-sided chest pain this morning.     FINDINGS:  The heart is normal in size. There is a large left side pneumothorax  with complete collapse of the left lung. Right lung is clear. There are  no pleural effusions.       Impression:       Large left side pneumothorax with complete collapse of the  left lung.     This report was finalized on 11/10/2018 7:18 AM by Dr. Christ Guevara MD.           Reviewed the images personally and agree with the assessments    ECG/EMG Results (most recent)     None        None done    Assessment/Plan     Pneumothorax with chest tube placement/  By Dr. Prabhakar    Tobacco abuse/  Cessation discussed    H/O asthma/ No apparent problem now and no asthma issues for 2 years.  Will monitor    Essential hypertension/  Was very high today but came down.  Will monitor this ongoing      Thank you for this consult!        I discussed the patients findings and my recommendations with patient.        Kallie Cherry DO  11/10/18  4:26 PM    Time: [id number]  50 min

## 2018-11-10 NOTE — ANESTHESIA PREPROCEDURE EVALUATION
Anesthesia Evaluation     Patient summary reviewed and Nursing notes reviewed   no history of anesthetic complications:  NPO Solid Status: > 2 hours  NPO Liquid Status: > 2 hours           Airway   Mallampati: II  TM distance: >3 FB  Neck ROM: full  No difficulty expected  Dental      Pulmonary    (+) asthma, decreased breath sounds (left),     ROS comment: XR Chest 2 View ([id number]) (Order [id number])   Order   Status: Final result  Patient Location     Patient Class Location  Inpatient  [facility]      [phone]    Study Notes: XR Chest 2 View (Order #: [id number] Accession #: [id number])        Celia Theodore on 11/10/2018  6:54 AM  F/u left side PTX/chest tube.  Hx:  Pt woke up Friday morning with left side chest pain, SOA.  No known trauma, symptoms started after coughing episode.  +smoker.  shielded    Study Notes: XR Chest 2 View (Order #: [id number] Accession #: [id number])        Farhad Grossman on 11/9/2018  7:06 PM  AWAKENED THIS A.M. WITH C.P.    Study Notes: XR Chest 1 View (Order #: [id number] Accession #: [id number])        Celia Theodore on 11/10/2018  6:25 AM  Left chest tube placement.  Hx:  Left PTX - pt woke up this morning with left chest pain and SOA, no known trauma, symptoms began after a coughing spell.  +smoker.    Appointment Information     PACS Images      Radiology Images  Study Result     CHEST PA AND LATERAL 11/10/2018     HISTORY:   Follow-up left-sided pneumothorax status post chest tube placement on  11/09/2018. Patient awoke 11/09/2018 with left-sided chest pain and  shortness of breath. Smoking history.     FINDINGS:  The heart is normal in size. The left pleural tube tip is now located  along the lateral aspect of the left upper lobe. There is been a slight  increase in the left-sided pneumothorax, now graded at 10%. The lungs  are emphysematous. There are no pleural effusions.     IMPRESSION:  Slight increase in the left apical pneumothorax compared  with  11/09/2018 at 1950, now graded at 10%.     This report was finalized on 11/10/2018 7:23 AM by Dr. Christ Guevara MD.      Cardiovascular     Rhythm: regular  Rate: normal    (+) hypertension (not treated now),       Neuro/Psych- negative ROS  GI/Hepatic/Renal/Endo - negative ROS     Musculoskeletal (-) negative ROS    Abdominal    Substance History - negative use     OB/GYN          Other - negative ROS       ROS/Med Hx Other: Emergency                   Anesthesia Plan    ASA 2 - emergent     general     intravenous induction   Anesthetic plan, all risks, benefits, and alternatives have been provided, discussed and informed consent has been obtained with: patient.  Use of blood products discussed with patient  Consented to blood products.

## 2018-11-11 ENCOUNTER — APPOINTMENT (OUTPATIENT)
Dept: GENERAL RADIOLOGY | Facility: HOSPITAL | Age: 28
End: 2018-11-11

## 2018-11-11 LAB

## 2018-11-11 PROCEDURE — 81332 SERPINA1 GENE: CPT

## 2018-11-11 PROCEDURE — 99212 OFFICE O/P EST SF 10 MIN: CPT | Performed by: SURGERY

## 2018-11-11 PROCEDURE — 63710000001 DIPHENHYDRAMINE PER 50 MG: Performed by: INTERNAL MEDICINE

## 2018-11-11 PROCEDURE — 25010000002 HYDROMORPHONE PER 4 MG: Performed by: SURGERY

## 2018-11-11 PROCEDURE — 71045 X-RAY EXAM CHEST 1 VIEW: CPT

## 2018-11-11 PROCEDURE — 99232 SBSQ HOSP IP/OBS MODERATE 35: CPT | Performed by: HOSPITALIST

## 2018-11-11 PROCEDURE — 25010000002 HYDROMORPHONE PER 4 MG: Performed by: INTERNAL MEDICINE

## 2018-11-11 RX ORDER — SODIUM CHLORIDE, SODIUM LACTATE, POTASSIUM CHLORIDE, CALCIUM CHLORIDE 600; 310; 30; 20 MG/100ML; MG/100ML; MG/100ML; MG/100ML
50 INJECTION, SOLUTION INTRAVENOUS CONTINUOUS
Status: DISCONTINUED | OUTPATIENT
Start: 2018-11-11 | End: 2018-11-13

## 2018-11-11 RX ORDER — DIPHENHYDRAMINE HCL 25 MG
25 CAPSULE ORAL EVERY 6 HOURS PRN
Status: DISCONTINUED | OUTPATIENT
Start: 2018-11-11 | End: 2018-11-14 | Stop reason: HOSPADM

## 2018-11-11 RX ORDER — ENALAPRILAT 2.5 MG/2ML
1.25 INJECTION INTRAVENOUS EVERY 6 HOURS
Status: DISCONTINUED | OUTPATIENT
Start: 2018-11-11 | End: 2018-11-12

## 2018-11-11 RX ADMIN — OXYCODONE HYDROCHLORIDE AND ACETAMINOPHEN 2 TABLET: 5; 325 TABLET ORAL at 21:48

## 2018-11-11 RX ADMIN — Medication 3 ML: at 20:25

## 2018-11-11 RX ADMIN — DIPHENHYDRAMINE HYDROCHLORIDE 25 MG: 25 CAPSULE ORAL at 21:45

## 2018-11-11 RX ADMIN — DIPHENHYDRAMINE HYDROCHLORIDE 25 MG: 25 CAPSULE ORAL at 10:07

## 2018-11-11 RX ADMIN — Medication 3 ML: at 09:15

## 2018-11-11 RX ADMIN — HYDROMORPHONE HYDROCHLORIDE 2 MG: 2 INJECTION, SOLUTION INTRAMUSCULAR; INTRAVENOUS; SUBCUTANEOUS at 14:33

## 2018-11-11 RX ADMIN — HYDROMORPHONE HYDROCHLORIDE 0.5 MG: 2 INJECTION, SOLUTION INTRAMUSCULAR; INTRAVENOUS; SUBCUTANEOUS at 19:45

## 2018-11-11 RX ADMIN — ENALAPRILAT 1.25 MG: 2.5 INJECTION INTRAVENOUS at 17:17

## 2018-11-11 RX ADMIN — HYDROMORPHONE HYDROCHLORIDE 0.5 MG: 2 INJECTION, SOLUTION INTRAMUSCULAR; INTRAVENOUS; SUBCUTANEOUS at 12:37

## 2018-11-11 RX ADMIN — OXYCODONE HYDROCHLORIDE AND ACETAMINOPHEN 2 TABLET: 5; 325 TABLET ORAL at 13:49

## 2018-11-11 RX ADMIN — HYDROMORPHONE HYDROCHLORIDE 1 MG: 2 INJECTION, SOLUTION INTRAMUSCULAR; INTRAVENOUS; SUBCUTANEOUS at 03:02

## 2018-11-11 RX ADMIN — SODIUM CHLORIDE, POTASSIUM CHLORIDE, SODIUM LACTATE AND CALCIUM CHLORIDE 100 ML/HR: 600; 310; 30; 20 INJECTION, SOLUTION INTRAVENOUS at 02:42

## 2018-11-11 RX ADMIN — ENALAPRILAT 1.25 MG: 2.5 INJECTION INTRAVENOUS at 12:21

## 2018-11-11 RX ADMIN — OXYCODONE HYDROCHLORIDE AND ACETAMINOPHEN 2 TABLET: 5; 325 TABLET ORAL at 10:07

## 2018-11-11 RX ADMIN — HYDROMORPHONE HYDROCHLORIDE 2 MG: 2 INJECTION, SOLUTION INTRAMUSCULAR; INTRAVENOUS; SUBCUTANEOUS at 09:15

## 2018-11-11 RX ADMIN — DIPHENHYDRAMINE HYDROCHLORIDE 25 MG: 25 CAPSULE ORAL at 15:43

## 2018-11-11 RX ADMIN — SODIUM CHLORIDE, POTASSIUM CHLORIDE, SODIUM LACTATE AND CALCIUM CHLORIDE 50 ML/HR: 600; 310; 30; 20 INJECTION, SOLUTION INTRAVENOUS at 12:36

## 2018-11-11 RX ADMIN — NICOTINE 1 PATCH: 21 PATCH TRANSDERMAL at 10:04

## 2018-11-11 NOTE — PROGRESS NOTES
"Hospitalist Team      Patient Care Team:  Provider, No Known as PCP - General        Chief Complaint:  Pain chest tube site/ pneumothorax    Subjective    Interval History and ROS:     Patient States Mother in room. We discussed the chest tube and the reasons for pneumothorax and why we did not get a pulmonary consult.  I told her the best time to get a chest CT is after the chest tube comes out about 4 weeks later and his PCP can do this/ Dr. Collazo is who they will use.  Patient's mother uses Dr. Collazo.  She works in his office.  I told her I would order an alpha 1 antitrypsin but the results would not come back while he was in the hospital but the patient's PCP could check on the results when he has his follow up visit.    Patient Complaints: pain in chest from chest tube  Patient Denies:  Nausea and vomiting  History taken from: patient chart family RN  He does not have the typical body for tall man syndrome and he does not remember trauma, sneezing, or coughing hard.   We discussed no smoking again.    Objective    Vital Signs  Temp:  [97.6 °F (36.4 °C)-98.2 °F (36.8 °C)] 97.6 °F (36.4 °C)  Heart Rate:  [54-87] 54  Resp:  [16-18] 18  BP: (131-162)/() 162/101  Oxygen Therapy  SpO2: 100 %  Pulse Oximetry Type: Continuous  Device (Oxygen Therapy): nasal cannula  Flow (L/min): 1  ETCO2 (mmHg): 32 mmHg}  Flowsheet Rows      First Filed Value   Admission Height  175.3 cm (69\") Documented at 11/09/2018 1835   Admission Weight  81.6 kg (180 lb) Documented at 11/09/2018 1835        Body mass index is 26.57 kg/m².      Physical Exam:    Physical Exam   Constitutional: Patient appears well-developed and well-nourished and in no acute distress.  He does not have the typical body for tall man syndrome and he does not remember trauma, sneezing, or coughing hard.   HEENT:   Head: Normocephalic and atraumatic.   Eyes:  Pupils are equal, round, and reactive to light. EOM are intact. Sclerae are anicteric and " non-injected.  Mouth and Throat: Patient has moist mucous membranes. Oropharynx is clear of any erythema or exudate.     Neck: Neck supple. No JVD present. No thyromegaly present. No lymphadenopathy present.  Cardiovascular: Regular rate, regular rhythm, S1 normal and S2 normal.  Exam reveals no gallop and no friction rub.  No murmur heard.  Pulmonary/Chest: Lungs are clear to auscultation bilaterally.  He is breathing shallow and splinting. No respiratory distress. No wheezes. No rhonchi. No rales.   Abdominal: Soft. Bowel sounds are normal. No distension and no mass. There is no hepatosplenomegaly. There is no tenderness.   Musculoskeletal: Normal muscle tone  Extremities: No edema. Pulses are palpable in all 4 extremities.  Neurological: Patient is alert and oriented to person, place, and time. Cranial nerves II-XII are grossly intact with no focal deficits.  Skin: Skin is warm. No rash noted. Nails show no clubbing.  No cyanosis or erythema.         Results Review:     I reviewed the patient's new clinical results.    Lab Results (last 24 hours)     Procedure Component Value Units Date/Time    Respiratory Panel, PCR - Swab, Nasopharynx [[id number]] Collected:  11/10/18 1836    Specimen:  Swab from Nasopharynx Updated:  11/10/18 1839          Imaging Results (last 24 hours)     Procedure Component Value Units Date/Time    XR Chest 1 View [[id number]] Collected:  11/11/18 0727     Updated:  11/11/18 0731    Narrative:       CHEST ONE VIEW 11/10/2018     HISTORY:   Left-sided pneumothorax, follow-up, status post left pleural tube  placement today.     FINDINGS:  The heart is normal in size. Left pleural tube is been inserted with the  tip along the medial aspect of the left upper lobe with resolution of  the previously noted left-sided pneumothorax. The lungs are clear. There  are no pleural effusions.       Impression:       Resolution of left-sided pneumothorax status post left  pleural tube placement today.      Initial stat report to Dr. Prabhakar by Dr. Guevara at 12:16 PM on 11/10/2018.     This report was finalized on 11/11/2018 7:28 AM by Dr. Christ Guevara MD.             Xray images reviewed personally by physician.  Agree with readings.      ECG/EMG Results (most recent)     None      NA    Medication Review:   I have reviewed the patient's current medication list    Current Facility-Administered Medications:   •  diphenhydrAMINE (BENADRYL) capsule 25 mg, 25 mg, Oral, Q6H PRN, Emmett Baxter MD, 25 mg at 11/10/18 2121  •  HYDROmorphone (DILAUDID) injection 0.5 mg, 0.5 mg, Intravenous, Q2H PRN **OR** HYDROmorphone (DILAUDID) injection 1 mg, 1 mg, Intravenous, Q2H PRN, Ismael Prabhakar MD, 1 mg at 11/11/18 0302  •  HYDROmorphone (DILAUDID) injection 2 mg, 2 mg, Intravenous, Q3H PRN, Emmett Baxter MD  •  lactated ringers infusion, 100 mL/hr, Intravenous, Continuous, Kimber Renteria, CRNA, Last Rate: 100 mL/hr at 11/11/18 0242, 100 mL/hr at 11/11/18 0242  •  nicotine (NICODERM CQ) 21 MG/24HR patch 1 patch, 1 patch, Transdermal, Q24H, Ismael Prabhakar MD, 1 patch at 11/10/18 1631  •  oxyCODONE-acetaminophen (PERCOCET) 5-325 MG per tablet 1 tablet, 1 tablet, Oral, Q4H PRN, Ismael Prabhakar MD  •  oxyCODONE-acetaminophen (PERCOCET) 5-325 MG per tablet 2 tablet, 2 tablet, Oral, Q4H PRN, Ismael Prabhakar MD, 2 tablet at 11/10/18 2233  •  promethazine (PHENERGAN) IV syringe 12.5 mg, 12.5 mg, Intravenous, Q4H PRN, Ismael Prabhakar MD, 12.5 mg at 11/10/18 2230  •  sodium chloride 0.9 % flush 3 mL, 3 mL, Intravenous, Q12H, Ismael Prabhakar MD, 3 mL at 11/10/18 1430  •  sodium chloride 0.9 % flush 3-10 mL, 3-10 mL, Intravenous, PRN, Ismael Prabhakar MD      Assessment/Plan     Pneumothorax with chest tube placement/  By Dr. Prabhakar        Alpha 1 antitrypsin was ordered and can be followed up by Dr. Collazo who will be seeing him as an outpatient after this hospitalization per his mother.     Tobacco abuse/  Cessation  discussed     H/O asthma/ No apparent problem now and no asthma issues for 2 years.  Will monitor     Essential hypertension/  Was very high today but came down.  Will monitor this ongoing             Plan for disposition:  Home when ready    Kallie Cherry,   11/11/18  8:28 AM      Time: 30 min

## 2018-11-11 NOTE — PLAN OF CARE
Problem: Patient Care Overview  Goal: Individualization and Mutuality  Outcome: Ongoing (interventions implemented as appropriate)      Problem: Chest Tube Drainage Device (Adult)  Goal: Signs and Symptoms of Listed Potential Problems Will be Absent, Minimized or Managed (Chest Tube Drainage Device)  Outcome: Ongoing (interventions implemented as appropriate)      Problem: Infection, Risk/Actual (Adult)  Goal: Infection Prevention/Resolution  Outcome: Ongoing (interventions implemented as appropriate)      Problem: Pain, Acute (Adult)  Goal: Acceptable Pain Control/Comfort Level  Outcome: Ongoing (interventions implemented as appropriate)

## 2018-11-11 NOTE — PROGRESS NOTES
Chief complaint f/u on chest tube  His pain is better controlled.  He denies any shortness of breath.  His blood pressure is elevated and he does have a history of prior hypertension.  I discussed this with the hospitalist and they will address this.  I see no evidence of air leak today.  His lungs are clear without wheezes today.  Chest x-ray was reviewed by me and I see no evidence of ongoing pneumothorax.  The film has yet to be read and I have requested that it be read.  If there is no recurrent pneumothorax  then we will take the chest tube off suction.  Recheck chest x-ray in the a.m.

## 2018-11-12 ENCOUNTER — APPOINTMENT (OUTPATIENT)
Dept: GENERAL RADIOLOGY | Facility: HOSPITAL | Age: 28
End: 2018-11-12

## 2018-11-12 PROCEDURE — 25010000002 HYDROMORPHONE PER 4 MG: Performed by: INTERNAL MEDICINE

## 2018-11-12 PROCEDURE — 94799 UNLISTED PULMONARY SVC/PX: CPT

## 2018-11-12 PROCEDURE — 25010000002 HYDROMORPHONE PER 4 MG: Performed by: SURGERY

## 2018-11-12 PROCEDURE — 99212 OFFICE O/P EST SF 10 MIN: CPT | Performed by: SURGERY

## 2018-11-12 PROCEDURE — 25010000002 HYDRALAZINE PER 20 MG: Performed by: HOSPITALIST

## 2018-11-12 PROCEDURE — 71045 X-RAY EXAM CHEST 1 VIEW: CPT

## 2018-11-12 PROCEDURE — 63710000001 DIPHENHYDRAMINE PER 50 MG: Performed by: INTERNAL MEDICINE

## 2018-11-12 PROCEDURE — 99232 SBSQ HOSP IP/OBS MODERATE 35: CPT | Performed by: INTERNAL MEDICINE

## 2018-11-12 RX ORDER — LISINOPRIL 20 MG/1
20 TABLET ORAL
Status: DISCONTINUED | OUTPATIENT
Start: 2018-11-12 | End: 2018-11-14 | Stop reason: HOSPADM

## 2018-11-12 RX ORDER — HYDRALAZINE HYDROCHLORIDE 20 MG/ML
10 INJECTION INTRAMUSCULAR; INTRAVENOUS EVERY 4 HOURS PRN
Status: DISCONTINUED | OUTPATIENT
Start: 2018-11-12 | End: 2018-11-14

## 2018-11-12 RX ADMIN — HYDRALAZINE HYDROCHLORIDE 10 MG: 20 INJECTION INTRAMUSCULAR; INTRAVENOUS at 21:08

## 2018-11-12 RX ADMIN — NICOTINE 1 PATCH: 21 PATCH TRANSDERMAL at 08:50

## 2018-11-12 RX ADMIN — HYDROMORPHONE HYDROCHLORIDE 1 MG: 2 INJECTION, SOLUTION INTRAMUSCULAR; INTRAVENOUS; SUBCUTANEOUS at 08:50

## 2018-11-12 RX ADMIN — ENALAPRILAT 1.25 MG: 2.5 INJECTION INTRAVENOUS at 12:40

## 2018-11-12 RX ADMIN — HYDROMORPHONE HYDROCHLORIDE 2 MG: 2 INJECTION, SOLUTION INTRAMUSCULAR; INTRAVENOUS; SUBCUTANEOUS at 14:10

## 2018-11-12 RX ADMIN — LISINOPRIL 20 MG: 20 TABLET ORAL at 21:02

## 2018-11-12 RX ADMIN — OXYCODONE HYDROCHLORIDE AND ACETAMINOPHEN 2 TABLET: 5; 325 TABLET ORAL at 12:43

## 2018-11-12 RX ADMIN — ENALAPRILAT 1.25 MG: 2.5 INJECTION INTRAVENOUS at 18:59

## 2018-11-12 RX ADMIN — DIPHENHYDRAMINE HYDROCHLORIDE 25 MG: 25 CAPSULE ORAL at 19:07

## 2018-11-12 RX ADMIN — SODIUM CHLORIDE, POTASSIUM CHLORIDE, SODIUM LACTATE AND CALCIUM CHLORIDE 50 ML/HR: 600; 310; 30; 20 INJECTION, SOLUTION INTRAVENOUS at 09:58

## 2018-11-12 RX ADMIN — OXYCODONE HYDROCHLORIDE AND ACETAMINOPHEN 1 TABLET: 5; 325 TABLET ORAL at 19:07

## 2018-11-12 RX ADMIN — Medication 3 ML: at 21:09

## 2018-11-12 RX ADMIN — DIPHENHYDRAMINE HYDROCHLORIDE 25 MG: 25 CAPSULE ORAL at 12:43

## 2018-11-12 RX ADMIN — HYDROMORPHONE HYDROCHLORIDE 2 MG: 2 INJECTION, SOLUTION INTRAMUSCULAR; INTRAVENOUS; SUBCUTANEOUS at 11:15

## 2018-11-12 RX ADMIN — HYDROMORPHONE HYDROCHLORIDE 0.5 MG: 2 INJECTION, SOLUTION INTRAMUSCULAR; INTRAVENOUS; SUBCUTANEOUS at 00:56

## 2018-11-12 RX ADMIN — HYDROMORPHONE HYDROCHLORIDE 1 MG: 2 INJECTION, SOLUTION INTRAMUSCULAR; INTRAVENOUS; SUBCUTANEOUS at 15:35

## 2018-11-12 RX ADMIN — HYDROMORPHONE HYDROCHLORIDE 1 MG: 2 INJECTION, SOLUTION INTRAMUSCULAR; INTRAVENOUS; SUBCUTANEOUS at 03:29

## 2018-11-12 RX ADMIN — ENALAPRILAT 1.25 MG: 2.5 INJECTION INTRAVENOUS at 06:43

## 2018-11-12 NOTE — PROGRESS NOTES
Chief complaint f/u on chest tube  He complains of some incisional pain.  His blood pressure is still up and I will ask the hospitalist to address that.  He has no evidence of an air leak.  His chest x-ray was reviewed by me and is expanded.  His lungs are clear and equal today.  We will try clamping his chest tube and recheck a chest x-ray in the a.m. if it stays expanded we'll remove the chest tube tomorrow.

## 2018-11-12 NOTE — PLAN OF CARE
Problem: Patient Care Overview  Goal: Plan of Care Review  Outcome: Ongoing (interventions implemented as appropriate)   11/12/18 0532   Coping/Psychosocial   Plan of Care Reviewed With patient   Plan of Care Review   Progress improving   OTHER   Outcome Summary No shortness of air reported. PRN pain medication administered as needed. Chest tube to gravity per order. Will conitnue to monitor.        Problem: Chest Tube Drainage Device (Adult)  Goal: Signs and Symptoms of Listed Potential Problems Will be Absent, Minimized or Managed (Chest Tube Drainage Device)  Outcome: Ongoing (interventions implemented as appropriate)      Problem: Infection, Risk/Actual (Adult)  Goal: Infection Prevention/Resolution  Outcome: Ongoing (interventions implemented as appropriate)      Problem: Pain, Acute (Adult)  Goal: Acceptable Pain Control/Comfort Level  Outcome: Ongoing (interventions implemented as appropriate)   11/12/18 0533   Pain, Acute (Adult)   Acceptable Pain Control/Comfort Level making progress toward outcome

## 2018-11-12 NOTE — NURSING NOTE
Continued Stay Note  MELODY Quarles     Patient Name: Santy Maxwell  MRN: [id number]  Today's Date: 11/12/2018    Admit Date: 11/9/2018    Discharge Plan     Row Name 11/12/18 8971       Plan    Plan  plan home    Patient/Family in Agreement with Plan  yes    Plan Comments  Follow up visit with patient. He is dozing, S.O. at bedside. Denies needs at this time, CM# is on white board. Will continue to follow for dc needs.        Discharge Codes    No documentation.             Jay Garner RN

## 2018-11-12 NOTE — PROGRESS NOTES
Patient: Santy Maxwell  Procedure(s) with comments:  CHEST TUBE INSERTION - LEFT CHEST TUBE INSERTION  Anesthesia type: [unfilled]    Patient location: ICU  Vitals:    11/12/18 0400 11/12/18 0620 11/12/18 0643 11/12/18 0803   BP: 165/98 148/97     BP Location: Right arm      Patient Position: Sitting      Pulse:  51 54 73   Resp:  17     Temp:  97.9 °F (36.6 °C)     TempSrc:  Oral     SpO2:  97%  99%   Weight:       Height:         Level of consciousness: awake, alert and oriented    Post-anesthesia pain: adequate analgesia  Airway patency: patent  Respiratory: unassisted  Cardiovascular: stable and blood pressure at baseline  Hydration: euvolemic    Anesthetic complications: no

## 2018-11-12 NOTE — SIGNIFICANT NOTE
Patient's girlfriend came up to the nurse's station shortly after patient finished his portable CXR and stated he was very SOA. Upon assessment, patient was tachypneic. Patient had rapid shallow breathing. Chest tube was in good placement with no leaks. Connections checked. SpO2 was WNL. BP was elevated. Patient was tense and anxious. Lung sounds were unchanged. Patient' chest tube was placed on suction. CXR results were negative. Dr. Prabhakar was called to discuss case and the above findings. This RN was told to place patient back on water seal with no further interventions at this time except to monitor the patient closely. Dr. Thompson called for an order for extra pain med dose due to patient's pain level of 10/10. Pain med given and patient monitored closely. About 10 minutes after pain med given, patient did fall asleep.

## 2018-11-13 ENCOUNTER — APPOINTMENT (OUTPATIENT)
Dept: GENERAL RADIOLOGY | Facility: HOSPITAL | Age: 28
End: 2018-11-13

## 2018-11-13 LAB
ANION GAP SERPL CALCULATED.3IONS-SCNC: 11.3 MMOL/L
BLASTS NFR BLD MANUAL: 0 % (ref 0–0)
BUN BLD-MCNC: 12 MG/DL (ref 6–20)
BUN/CREAT SERPL: 12.2 (ref 7–25)
CALCIUM SPEC-SCNC: 9.3 MG/DL (ref 8.6–10.5)
CHLORIDE SERPL-SCNC: 98 MMOL/L (ref 98–107)
CO2 SERPL-SCNC: 23.7 MMOL/L (ref 22–29)
CREAT BLD-MCNC: 0.98 MG/DL (ref 0.76–1.27)
DEPRECATED RDW RBC AUTO: 41.1 FL (ref 37–54)
ERYTHROCYTE [DISTWIDTH] IN BLOOD BY AUTOMATED COUNT: 12.1 % (ref 11.5–14.5)
GFR SERPL CREATININE-BSD FRML MDRD: 90 ML/MIN/1.73
GLUCOSE BLD-MCNC: 119 MG/DL (ref 65–99)
HCT VFR BLD AUTO: 45.9 % (ref 42–52)
HGB BLD-MCNC: 15.7 G/DL (ref 14–18)
LYMPHOCYTES # BLD MANUAL: 0.62 10*3/MM3 (ref 0.6–4.8)
LYMPHOCYTES NFR BLD MANUAL: 4 % (ref 20–45)
LYMPHOCYTES NFR BLD MANUAL: 8 % (ref 3–8)
MCH RBC QN AUTO: 31.5 PG (ref 27–31)
MCHC RBC AUTO-ENTMCNC: 34.2 G/DL (ref 31–37)
MCV RBC AUTO: 92.2 FL (ref 80–94)
METAMYELOCYTES NFR BLD MANUAL: 0 % (ref 0–0)
MONOCYTES # BLD AUTO: 1.25 10*3/MM3 (ref 0–1)
MYELOCYTES NFR BLD MANUAL: 0 % (ref 0–0)
NEUTROPHILS # BLD AUTO: 13.73 10*3/MM3 (ref 1.5–8.3)
NEUTROPHILS NFR BLD MANUAL: 85 % (ref 45–70)
NEUTS BAND NFR BLD MANUAL: 3 % (ref 0–5)
OTHER CELLS %: 0 % (ref 0–0)
PLASMA CELL PREC NFR BLD MANUAL: 0 % (ref 0–0)
PLAT MORPH BLD: NORMAL
PLATELET # BLD AUTO: 175 10*3/MM3 (ref 140–500)
PMV BLD AUTO: 10.5 FL (ref 7.4–10.4)
POTASSIUM BLD-SCNC: 4.1 MMOL/L (ref 3.5–5.2)
PROLYMPHOCYTES NFR BLD MANUAL: 0 % (ref 0–0)
PROMYELOCYTES NFR BLD MANUAL: 0 % (ref 0–0)
RBC # BLD AUTO: 4.98 10*6/MM3 (ref 4.7–6.1)
RBC MORPH BLD: NORMAL
SODIUM BLD-SCNC: 133 MMOL/L (ref 136–145)
VARIANT LYMPHS NFR BLD MANUAL: 0 % (ref 0–5)
WBC MORPH BLD: NORMAL
WBC NRBC COR # BLD: 15.6 10*3/MM3 (ref 4.8–10.8)

## 2018-11-13 PROCEDURE — 85027 COMPLETE CBC AUTOMATED: CPT | Performed by: NURSE PRACTITIONER

## 2018-11-13 PROCEDURE — 85007 BL SMEAR W/DIFF WBC COUNT: CPT | Performed by: NURSE PRACTITIONER

## 2018-11-13 PROCEDURE — 71046 X-RAY EXAM CHEST 2 VIEWS: CPT

## 2018-11-13 PROCEDURE — 99231 SBSQ HOSP IP/OBS SF/LOW 25: CPT | Performed by: NURSE PRACTITIONER

## 2018-11-13 PROCEDURE — 25010000002 HYDROMORPHONE PER 4 MG: Performed by: SURGERY

## 2018-11-13 PROCEDURE — 63710000001 DIPHENHYDRAMINE PER 50 MG: Performed by: INTERNAL MEDICINE

## 2018-11-13 PROCEDURE — 80048 BASIC METABOLIC PNL TOTAL CA: CPT | Performed by: INTERNAL MEDICINE

## 2018-11-13 PROCEDURE — 71045 X-RAY EXAM CHEST 1 VIEW: CPT

## 2018-11-13 PROCEDURE — 94799 UNLISTED PULMONARY SVC/PX: CPT

## 2018-11-13 PROCEDURE — 99212 OFFICE O/P EST SF 10 MIN: CPT | Performed by: SURGERY

## 2018-11-13 RX ADMIN — DIPHENHYDRAMINE HYDROCHLORIDE 25 MG: 25 CAPSULE ORAL at 22:13

## 2018-11-13 RX ADMIN — OXYCODONE HYDROCHLORIDE AND ACETAMINOPHEN 2 TABLET: 5; 325 TABLET ORAL at 16:15

## 2018-11-13 RX ADMIN — OXYCODONE HYDROCHLORIDE AND ACETAMINOPHEN 1 TABLET: 5; 325 TABLET ORAL at 01:08

## 2018-11-13 RX ADMIN — DIPHENHYDRAMINE HYDROCHLORIDE 25 MG: 25 CAPSULE ORAL at 16:20

## 2018-11-13 RX ADMIN — HYDROMORPHONE HYDROCHLORIDE 1 MG: 2 INJECTION, SOLUTION INTRAMUSCULAR; INTRAVENOUS; SUBCUTANEOUS at 12:17

## 2018-11-13 RX ADMIN — SODIUM CHLORIDE, POTASSIUM CHLORIDE, SODIUM LACTATE AND CALCIUM CHLORIDE 50 ML/HR: 600; 310; 30; 20 INJECTION, SOLUTION INTRAVENOUS at 06:14

## 2018-11-13 RX ADMIN — DIPHENHYDRAMINE HYDROCHLORIDE 25 MG: 25 CAPSULE ORAL at 01:10

## 2018-11-13 RX ADMIN — NICOTINE 1 PATCH: 21 PATCH TRANSDERMAL at 08:10

## 2018-11-13 RX ADMIN — Medication 3 ML: at 08:10

## 2018-11-13 RX ADMIN — Medication 3 ML: at 22:11

## 2018-11-13 RX ADMIN — OXYCODONE HYDROCHLORIDE AND ACETAMINOPHEN 1 TABLET: 5; 325 TABLET ORAL at 08:10

## 2018-11-13 RX ADMIN — OXYCODONE HYDROCHLORIDE AND ACETAMINOPHEN 2 TABLET: 5; 325 TABLET ORAL at 22:13

## 2018-11-13 RX ADMIN — LISINOPRIL 20 MG: 20 TABLET ORAL at 08:47

## 2018-11-13 NOTE — PLAN OF CARE
Problem: Chest Tube Drainage Device (Adult)  Intervention: Prevent or Manage Tube/Equipment Mechanical Dysfunction   11/13/18 0503   Respiratory Interventions   Chest Tube Safety all connections secured;petroleum gauze dressing with patient;all tubing connections taped;rubber-tipped hemostat(s) with patient     Intervention: Maximize Oxygenation/Ventilation/Perfusion   11/13/18 0503   Respiratory Interventions   Airway/Ventilation Management pulmonary hygiene promoted;airway patency maintained;calming measures promoted   Positioning   Head of Bed (HOB) HOB at 30-45 degrees       Goal: Signs and Symptoms of Listed Potential Problems Will be Absent, Minimized or Managed (Chest Tube Drainage Device)  Outcome: Ongoing (interventions implemented as appropriate)   11/13/18 0503   Goal/Outcome Evaluation   Problems Assessed (Chest Tube Drainage Device) all   Problems Assessed (Chest Tube Drain Dev) none       Problem: Infection, Risk/Actual (Adult)  Intervention: Manage Suspected/Actual Infection   11/13/18 0503   Prevent/Manage Colorectal Surgical Infection   Fever Reduction/Comfort Measures fluid intake increased     Intervention: Prevent Infection/Maximize Resistance   11/13/18 0503   Respiratory Interventions   Airway/Ventilation Management pulmonary hygiene promoted;airway patency maintained;calming measures promoted   Nutrition Interventions   Glycemic Management oral hydration promoted   Oral Nutrition Promotion calorie dense foods provided   Pain/Comfort/Sleep Interventions   Sleep/Rest Enhancement awakenings minimized;regular sleep/rest pattern promoted         Problem: Pain, Acute (Adult)  Intervention: Monitor and Manage Analgesia   11/13/18 0503   Promote Effective Bowel Elimination   Bowel Intervention adequate fluid intake promoted   Safety Management   Medication Review/Management medications reviewed     Intervention: Mutually Develop/Implement Acute Pain Management Plan   11/13/18 0503   Promote  Oxygenation/Perfusion   Pain Management Interventions awakened for pain meds per patient request;pain management plan reviewed with patient/caregiver;position adjusted   Cognitive Interventions   Sensory Stimulation Regulation quiet environment promoted;tactile stimulation minimized     Intervention: Support/Optimize Psychosocial Response to Acute Pain   11/13/18 0503   Coping/Psychosocial Interventions   Supportive Measures self-care encouraged;verbalization of feelings encouraged;active listening utilized   Psychosocial Support   Family/Support System Care self-care encouraged       Goal: Acceptable Pain Control/Comfort Level  Outcome: Ongoing (interventions implemented as appropriate)   11/13/18 0503   Pain, Acute (Adult)   Acceptable Pain Control/Comfort Level making progress toward outcome       Problem: Breathing Pattern Ineffective (Adult)  Intervention: Optimize Oxygenation/Ventilation/Perfusion   11/13/18 0503   Respiratory Interventions   Airway/Ventilation Management pulmonary hygiene promoted;airway patency maintained;calming measures promoted   Breathing Techniques/Airway Clearance deep/controlled cough encouraged;pursed-lip breathing encouraged   Positioning   Head of Bed (HOB) HOB at 30-45 degrees     Intervention: Minimize Oxygen Consumption/Demand   11/13/18 0503   Coping/Psychosocial Interventions   Environmental Support calm environment promoted;caregiver consistency promoted;personal routine supported   Activity   Activity Management activity minimized     Intervention: Monitor/Manage Contributing Psychosocial Factors   11/13/18 0503   Coping/Psychosocial Interventions   Supportive Measures self-care encouraged;verbalization of feelings encouraged;active listening utilized   Psychosocial Support   Family/Support System Care self-care encouraged

## 2018-11-13 NOTE — PLAN OF CARE
Problem: Patient Care Overview  Goal: Plan of Care Review  Outcome: Ongoing (interventions implemented as appropriate)   11/12/18 2002   Coping/Psychosocial   Plan of Care Reviewed With patient;significant other   Plan of Care Review   Progress no change   OTHER   Outcome Summary CXR still shows no pneumothorax. Patient did have an instance of SOA today (see nursing note). Patient slept for about 3 hours after the SOA episode. Patient did have increased pain when chest tube was clamped. Tube was then unclamped and follow-up CXR was still negative. Possibly have chest tube pulled tomorrow if cleared with Dr. Prabhakar.     Goal: Individualization and Mutuality  Outcome: Ongoing (interventions implemented as appropriate)   11/12/18 2002   Individualization   Patient Specific Goals (Include Timeframe) discontinue chest tube tomorrow   Patient Specific Interventions MD stated patient could have chest tube pulled on day 3 of having the chest tube if patient can tolerate it     Goal: Discharge Needs Assessment  Outcome: Ongoing (interventions implemented as appropriate)   11/12/18 2002   Discharge Needs Assessment   Concerns to be Addressed denies needs/concerns at this time     Goal: Interprofessional Rounds/Family Conf  Outcome: Ongoing (interventions implemented as appropriate)      Problem: Chest Tube Drainage Device (Adult)  Goal: Signs and Symptoms of Listed Potential Problems Will be Absent, Minimized or Managed (Chest Tube Drainage Device)  Outcome: Ongoing (interventions implemented as appropriate)   11/12/18 2002   Goal/Outcome Evaluation   Problems Assessed (Chest Tube Drainage Device) all   Problems Assessed (Chest Tube Drain Dev) pain       Problem: Infection, Risk/Actual (Adult)  Goal: Infection Prevention/Resolution  Outcome: Ongoing (interventions implemented as appropriate)   11/12/18 2002   Infection, Risk/Actual (Adult)   Infection Prevention/Resolution making progress toward outcome       Problem: Pain,  Acute (Adult)  Goal: Acceptable Pain Control/Comfort Level  Outcome: Ongoing (interventions implemented as appropriate)   11/12/18 2002   Pain, Acute (Adult)   Acceptable Pain Control/Comfort Level making progress toward outcome       Problem: Breathing Pattern Ineffective (Adult)  Goal: Identify Related Risk Factors and Signs and Symptoms  Outcome: Outcome(s) achieved Date Met: 11/12/18 11/12/18 2002   Breathing Pattern Ineffective (Adult)   Related Risk Factors (Breathing Pattern Ineffective) pain;underlying condition;smoking   Signs and Symptoms (Breathing Pattern Ineffective) accessory muscle use;activity intolerance;anxiousness;breath sounds abnormal;appetite change;breathing pattern altered;breathlessness     Goal: Effective Oxygenation/Ventilation  Outcome: Ongoing (interventions implemented as appropriate)   11/12/18 2002   Breathing Pattern Ineffective (Adult)   Effective Oxygenation/Ventilation making progress toward outcome     Goal: Anxiety/Fear Reduction  Outcome: Ongoing (interventions implemented as appropriate)   11/12/18 2002   Breathing Pattern Ineffective (Adult)   Anxiety/Fear Reduction making progress toward outcome

## 2018-11-13 NOTE — PROGRESS NOTES
Chief complaint f/u on chest tube  He complains of left-sided chest pain.  His chest x-ray shows his lung is expanded and he has some atelectasis.  He was no evidence of air leak.  Chest tube was removed  Vaseline gauze dressing was applied we will check a post removal chest x-ray.  His lungs are clear and equal  Post chest tube removal chest x-ray shows no evidence of pneumothorax.  His white blood count is elevated I am unclear why this is we will recheck a CBC in the a.m.

## 2018-11-13 NOTE — PROGRESS NOTES
"SERVICE: [facility] HOSPITALIST    CONSULTANTS:   Gen Surg - Primary    CHIEF COMPLAINT: HTN    SUBJECTIVE:\      Pt states he was given a 'handful of samples' back when he was a teenager, but did not continue his medications since they were too expensive for an 17 yr old.     Hasn't taken a blood pressure medication in over 10 years.    Currently complaints of pain from the top of the right shoulder down to the chest tube that is worse with breathing. 6/10 with sharp worsening with deep breath. Got 2mg of Dilaudid at 1410, then 1mg at 1535, and 5mg oxycodone at 1907.     Pt states he will be getting nicotine patches when he is discharged, and understands that stopping smoking it one of the biggest ways to reduce his risk of recurrence.     OBJECTIVE:    BP (!) 152/105 (BP Location: Left arm, Patient Position: Lying)   Pulse 90   Temp 98.6 °F (37 °C) (Oral)   Resp 16   Ht 175.3 cm (69.02\")   Wt 81.6 kg (180 lb)   SpO2 98%   BMI 26.57 kg/m²     MEDS/LABS REVIEWED AND ORDERED      lisinopril 20 mg Oral Q24H   nicotine 1 patch Transdermal Q24H   sodium chloride 3 mL Intravenous Q12H       Physical Exam   Constitutional: He appears well-developed and well-nourished. He appears lethargic. No distress.   Sleeping and resting comfortably in bed when entered the room, girlfriend in chair near bed   Pulmonary/Chest: Breath sounds normal. No respiratory distress. He has no wheezes. He has no rales.   RR slow normal, non-labored   Abdominal: Soft. Bowel sounds are normal. He exhibits no distension. There is no tenderness.   Musculoskeletal: He exhibits no edema.   Neurological: He appears lethargic. GCS eye subscore is 4. GCS verbal subscore is 5. GCS motor subscore is 6.   Skin: Skin is warm and dry. He is not diaphoretic.   Psychiatric: His speech is delayed. He is slowed.   Deferred mood, affect constricted   Nursing note and vitals reviewed.      LAB/DIAGNOSTICS:    Lab Results (last 24 hours)     " ** No results found for the last 24 hours. **           Xr Chest 1 View    Result Date: 11/12/2018  No pneumothorax. No interval change.  This report was finalized on 11/12/2018 3:25 PM by Dr. Sameer Huitron MD.      Xr Chest 1 View    Result Date: 11/12/2018  No pneumothorax today.  This report was finalized on 11/12/2018 6:46 AM by Dr. Sameer Huitron MD.      Xr Chest 1 View    Result Date: 11/12/2018  No pneumothorax.  A preliminary report was provided by Dr. Guevara at 1140 hours on 11/11/2018.  This report was finalized on 11/12/2018 6:39 AM by Dr. Sameer Huitron MD.          ASSESSMENT/PLAN:    Essetinal HTN  - History of High blood pressure in teenage years  - Needs PCP to manage  - Stopped schedule IV Enalapril, switched to PO Lisinopril 20mg, check lytes in AM    Pneumothorax  - Management per gen Surg  - Good air movement in lungs b/l      Uncontrolled pain  - Will leave pain management to primary team, already on mutliple pain medications

## 2018-11-13 NOTE — PROGRESS NOTES
"SERVICE: [facility] HOSPITALIST    CHIEF COMPLAINT: f/u HTN    SUBJECTIVE: The patient reports that his left chest pain dramatically increased yesterday and has remain the same but is well-controlled with oral pain medication.  He notes only occasional coughing. .  Reports he is unable to do IS over 750 mL today due to increased pain.  He is agreeable to get out of bed when able.  He notes his appetite is low and he is trying to eat and drink.  He otherwise denies f/c/n/v/d/abdominal pain or other new concerns.    OBJECTIVE:    /99   Pulse 85   Temp (!) 100.9 °F (38.3 °C)   Resp 18   Ht 175.3 cm (69.02\")   Wt 79.8 kg (176 lb)   SpO2 95%   BMI 25.98 kg/m²     MEDS/LABS REVIEWED AND ORDERED    lisinopril 20 mg Oral Q24H   nicotine 1 patch Transdermal Q24H   sodium chloride 3 mL Intravenous Q12H     Physical Exam   Constitutional: He is oriented to person, place, and time. He appears well-developed and well-nourished.   HENT:   Head: Normocephalic and atraumatic.   Eyes: EOM are normal. Pupils are equal, round, and reactive to light.   Cardiovascular: Normal rate and regular rhythm.   Pulmonary/Chest: Effort normal.   Diminished LLL otherwise clear   Abdominal: Soft. Bowel sounds are normal. He exhibits no distension. There is no tenderness. There is no guarding.   Musculoskeletal: He exhibits no edema.   Neurological: He is alert and oriented to person, place, and time.   Skin: Skin is warm and dry.   Left chest tube dressing clean and dry   Psychiatric: He has a normal mood and affect. His behavior is normal.   Vitals reviewed.    LAB/DIAGNOSTICS:    Lab Results (last 24 hours)     Procedure Component Value Units Date/Time    Basic Metabolic Panel [[id number]]  (Abnormal) Collected:  11/13/18 0614    Specimen:  Blood Updated:  11/13/18 0702     Glucose 119 mg/dL      BUN 12 mg/dL      Creatinine 0.98 mg/dL      Sodium 133 mmol/L      Potassium 4.1 mmol/L      Chloride 98 mmol/L      CO2 " 23.7 mmol/L      Calcium 9.3 mg/dL      eGFR Non African Amer 90 mL/min/1.73      BUN/Creatinine Ratio 12.2     Anion Gap 11.3 mmol/L     Narrative:       GFR Normal >60  Chronic Kidney Disease <60  Kidney Failure <15        No orders to display        Xr Chest 1 View    Result Date: 11/12/2018  No pneumothorax. No interval change.  This report was finalized on 11/12/2018 3:25 PM by Dr. Sameer Huitron MD.      Xr Chest 1 View    Result Date: 11/12/2018  No pneumothorax today.  This report was finalized on 11/12/2018 6:46 AM by Dr. Sameer Huitron MD.      Xr Chest Pa & Lateral    Result Date: 11/13/2018  No pneumothorax. There is bibasilar atelectasis today.  This report was finalized on 11/13/2018 8:33 AM by Dr. Sameer Huitron MD.      ASSESSMENT/PLAN:  HTN:   Much improved on oral lisinopril   If tolerating regular diet, recommend d/c IVFs, continue to control pain  Monitor    Tobacco abuse: nicotine patch in place, patient counseled    Acute left pneumothorax:   Low grade fever: management per surgery  CXR stable with atelectasis noted, likely cause of low grade fever  Check CBC, no other source of infection noted  Encourage IS/ambulation when chest tube clamped, per surgery

## 2018-11-14 VITALS
HEIGHT: 69 IN | TEMPERATURE: 99.1 F | OXYGEN SATURATION: 97 % | BODY MASS INDEX: 26.07 KG/M2 | RESPIRATION RATE: 20 BRPM | HEART RATE: 89 BPM | DIASTOLIC BLOOD PRESSURE: 90 MMHG | WEIGHT: 176 LBS | SYSTOLIC BLOOD PRESSURE: 153 MMHG

## 2018-11-14 LAB
BASOPHILS # BLD AUTO: 0.03 10*3/MM3 (ref 0–0.2)
BASOPHILS NFR BLD AUTO: 0.2 % (ref 0–2)
DEPRECATED RDW RBC AUTO: 42.3 FL (ref 37–54)
EOSINOPHIL # BLD AUTO: 0.07 10*3/MM3 (ref 0.1–0.3)
EOSINOPHIL NFR BLD AUTO: 0.6 % (ref 0–4)
ERYTHROCYTE [DISTWIDTH] IN BLOOD BY AUTOMATED COUNT: 12.1 % (ref 11.5–14.5)
HCT VFR BLD AUTO: 47.1 % (ref 42–52)
HGB BLD-MCNC: 15.9 G/DL (ref 14–18)
IMM GRANULOCYTES # BLD: 0.03 10*3/MM3 (ref 0–0.03)
IMM GRANULOCYTES NFR BLD: 0.2 % (ref 0–0.5)
LYMPHOCYTES # BLD AUTO: 0.85 10*3/MM3 (ref 0.6–4.8)
LYMPHOCYTES NFR BLD AUTO: 7 % (ref 20–45)
MCH RBC QN AUTO: 31.3 PG (ref 27–31)
MCHC RBC AUTO-ENTMCNC: 33.8 G/DL (ref 31–37)
MCV RBC AUTO: 92.7 FL (ref 80–94)
MONOCYTES # BLD AUTO: 1.16 10*3/MM3 (ref 0–1)
MONOCYTES NFR BLD AUTO: 9.6 % (ref 3–8)
NEUTROPHILS # BLD AUTO: 9.92 10*3/MM3 (ref 1.5–8.3)
NEUTROPHILS NFR BLD AUTO: 82.4 % (ref 45–70)
NRBC BLD MANUAL-RTO: 0 /100 WBC (ref 0–0)
PLATELET # BLD AUTO: 150 10*3/MM3 (ref 140–500)
PMV BLD AUTO: 11.2 FL (ref 7.4–10.4)
PROCALCITONIN SERPL-MCNC: 1.47 NG/ML (ref 0.1–0.25)
RBC # BLD AUTO: 5.08 10*6/MM3 (ref 4.7–6.1)
WBC NRBC COR # BLD: 12.06 10*3/MM3 (ref 4.8–10.8)

## 2018-11-14 PROCEDURE — 99238 HOSP IP/OBS DSCHRG MGMT 30/<: CPT | Performed by: SURGERY

## 2018-11-14 PROCEDURE — 63710000001 DIPHENHYDRAMINE PER 50 MG: Performed by: INTERNAL MEDICINE

## 2018-11-14 PROCEDURE — 84145 PROCALCITONIN (PCT): CPT | Performed by: INTERNAL MEDICINE

## 2018-11-14 PROCEDURE — 94799 UNLISTED PULMONARY SVC/PX: CPT

## 2018-11-14 PROCEDURE — 25010000002 CEFEPIME-DEXTROSE 1-5 GM-%(50ML) RECONSTITUTED SOLUTION

## 2018-11-14 PROCEDURE — 25010000002 HYDRALAZINE PER 20 MG: Performed by: HOSPITALIST

## 2018-11-14 PROCEDURE — 85025 COMPLETE CBC W/AUTO DIFF WBC: CPT | Performed by: SURGERY

## 2018-11-14 PROCEDURE — 99232 SBSQ HOSP IP/OBS MODERATE 35: CPT | Performed by: NURSE PRACTITIONER

## 2018-11-14 PROCEDURE — 87040 BLOOD CULTURE FOR BACTERIA: CPT | Performed by: INTERNAL MEDICINE

## 2018-11-14 PROCEDURE — 25010000002 VANCOMYCIN PER 500 MG: Performed by: INTERNAL MEDICINE

## 2018-11-14 RX ORDER — AMOXICILLIN AND CLAVULANATE POTASSIUM 875; 125 MG/1; MG/1
1 TABLET, FILM COATED ORAL EVERY 12 HOURS SCHEDULED
Status: DISCONTINUED | OUTPATIENT
Start: 2018-11-14 | End: 2018-11-14 | Stop reason: HOSPADM

## 2018-11-14 RX ORDER — CEFEPIME HYDROCHLORIDE 1 G/50ML
INJECTION, SOLUTION INTRAVENOUS
Status: COMPLETED
Start: 2018-11-14 | End: 2018-11-14

## 2018-11-14 RX ORDER — NICOTINE 21 MG/24HR
1 PATCH, TRANSDERMAL 24 HOURS TRANSDERMAL
Qty: 21 PATCH | Refills: 0 | Status: SHIPPED | OUTPATIENT
Start: 2018-11-15

## 2018-11-14 RX ORDER — HYDRALAZINE HYDROCHLORIDE 20 MG/ML
20 INJECTION INTRAMUSCULAR; INTRAVENOUS EVERY 4 HOURS PRN
Status: DISCONTINUED | OUTPATIENT
Start: 2018-11-14 | End: 2018-11-14 | Stop reason: HOSPADM

## 2018-11-14 RX ORDER — LISINOPRIL 20 MG/1
20 TABLET ORAL
Qty: 30 TABLET | Refills: 1 | Status: SHIPPED | OUTPATIENT
Start: 2018-11-15

## 2018-11-14 RX ORDER — AMOXICILLIN AND CLAVULANATE POTASSIUM 875; 125 MG/1; MG/1
1 TABLET, FILM COATED ORAL EVERY 12 HOURS SCHEDULED
Qty: 14 TABLET | Refills: 0 | Status: SHIPPED | OUTPATIENT
Start: 2018-11-14 | End: 2018-11-21

## 2018-11-14 RX ORDER — CEFEPIME HYDROCHLORIDE 1 G/50ML
1 INJECTION, SOLUTION INTRAVENOUS ONCE
Status: COMPLETED | OUTPATIENT
Start: 2018-11-14 | End: 2018-11-14

## 2018-11-14 RX ADMIN — HYDRALAZINE HYDROCHLORIDE 10 MG: 20 INJECTION INTRAMUSCULAR; INTRAVENOUS at 00:10

## 2018-11-14 RX ADMIN — HYDRALAZINE HYDROCHLORIDE 10 MG: 20 INJECTION INTRAMUSCULAR; INTRAVENOUS at 04:42

## 2018-11-14 RX ADMIN — CEFEPIME HYDROCHLORIDE 1 G: 1 INJECTION, SOLUTION INTRAVENOUS at 06:22

## 2018-11-14 RX ADMIN — DIPHENHYDRAMINE HYDROCHLORIDE 25 MG: 25 CAPSULE ORAL at 04:43

## 2018-11-14 RX ADMIN — Medication 3 ML: at 10:55

## 2018-11-14 RX ADMIN — OXYCODONE HYDROCHLORIDE AND ACETAMINOPHEN 2 TABLET: 5; 325 TABLET ORAL at 04:43

## 2018-11-14 RX ADMIN — LISINOPRIL 20 MG: 20 TABLET ORAL at 08:11

## 2018-11-14 RX ADMIN — AMOXICILLIN AND CLAVULANATE POTASSIUM 1 TABLET: 875; 125 TABLET, FILM COATED ORAL at 12:14

## 2018-11-14 RX ADMIN — OXYCODONE HYDROCHLORIDE AND ACETAMINOPHEN 1 TABLET: 5; 325 TABLET ORAL at 10:59

## 2018-11-14 RX ADMIN — NICOTINE 1 PATCH: 21 PATCH TRANSDERMAL at 08:11

## 2018-11-14 RX ADMIN — VANCOMYCIN 1500 MG: 500 INJECTION, SOLUTION INTRAVENOUS at 06:53

## 2018-11-14 NOTE — DISCHARGE SUMMARY
Admission date 11/9/18  Discharge date 11/14/18  Admission diagnosis spontaneous pneumothorax  Discharge diagnosis spontaneous pneumothorax resolved, hypertension  Procedure insertion of chest tube in the emergency room by the emergency room doctor and then subsequently replaced by myself 11/10/18  Discharge medications Augmentin and lisinopril per the hospitalist  Activity no lifting more than 5 pounds for 2 weeks he may shower and remove his dressing in 24 hours  Follow-up in my office in 6 days follow up with Dr. Silveira as well  Hospital course patient presented with spontaneous pneumothorax on his left side a 12 [place] chest tube was placed in the next morning it appeared to be malpositioned and he had a recurrent pneumothorax.  He was taken the operating room a chest tube was placed with complete reexpansion of his lung.  Postoperatively he had some pain issues and some blood pressure elevation.  The hospitalist were asked to see him in consultation.  Chest x-ray showed complete expansion but some atelectasis and I felt that this was the cause of his postoperative fever.  Chest tube was removed his lung remained expanded.  His white blood count was elevated but it is improving.  The hospitalists have placed him on some oral antibiotics and they have started him on some lisinopril for his blood pressure.  He'll be discharged home today to follow-up in my office in 6 days to call for problems.

## 2018-11-14 NOTE — PROGRESS NOTES
He says he feels well.  He denies a cough.  He does have a low-grade temp of 99.  He had a pro-calcitonin drawn last evening that is elevated I do not feel like he has sepsis.  He does not clinically have sepsis.  The hospitalist will send him home on Augmentin and that has been sent they will also send him home on lisinopril for his blood pressure.  He will follow-up with his primary care as well.  I will discharge him today to follow-up in my office on Tuesday.

## 2018-11-14 NOTE — NURSING NOTE
Case Management Discharge Note    Final Note: Patient discharged home to self care.     Destination      No service has been selected for the patient.      Durable Medical Equipment      No service has been selected for the patient.      Dialysis/Infusion      No service has been selected for the patient.      Home Medical Care      No service has been selected for the patient.      Community Resources      No service has been selected for the patient.             Final Discharge Disposition Code: 01 - home or self-care

## 2018-11-14 NOTE — NURSING NOTE
"Continued Stay Note  MELODY Quarles     Patient Name: Santy Maxwell  MRN: [id number]  Today's Date: 11/14/2018    Admit Date: 11/9/2018    Discharge Plan     Row Name 11/14/18 1233       Plan    Plan Comments  Spoke with patient with S.O. at bedside.  He says that he would like for Dr Collazo to be his PCP; \" he has known me all of my life\".  Called Dr Collazo's office to set up a post hospital visit.  Per Jesusita @[facility] office they are not accepting any new patients and they do not accept Passport insurance. Updated patient.  Patient wants to call the toll free Member Services number to get a PCP to follow up with.  Patient given toll free number; encouraged patient to make call as soon as possible.  Patient does have a follow up appointment with Dr Prabhakar set up.          Discharge Codes    No documentation.       Expected Discharge Date and Time     Expected Discharge Date Expected Discharge Time    Nov 14, 2018             Vanita Mi RN    "

## 2018-11-14 NOTE — PROGRESS NOTES
"SERVICE: [facility] HOSPITALIST    CHIEF COMPLAINT: Follow up hypertension, atelectasis    SUBJECTIVE: The patient reports that overall he is feeling well this morning, no shortness of breath and no coughing.  He notes he did not sleep well last night due to lab and staff being in the room on and off through the night due to his fever.  He reports that he is followed by Dr. Silveira outpatient and will comply with follow-up next week with both him and Dr. Prabhakar.  He otherwise notes mild nausea this am that he attributes to lack of sleep, and feels ready to go home. He denies v/d/abdominal pain or other new concerns.    OBJECTIVE:    /96 (BP Location: Left arm, Patient Position: Lying)   Pulse 84   Temp 98.4 °F (36.9 °C) (Oral)   Resp 20   Ht 175.3 cm (69.02\")   Wt 79.8 kg (176 lb)   SpO2 99%   BMI 25.98 kg/m²     MEDS/LABS REVIEWED AND ORDERED    amoxicillin-clavulanate 1 tablet Oral Q12H   lisinopril 20 mg Oral Q24H   nicotine 1 patch Transdermal Q24H   sodium chloride 3 mL Intravenous Q12H     Physical Exam   Constitutional: He is oriented to person, place, and time. He appears well-developed and well-nourished.   HENT:   Head: Normocephalic and atraumatic.   Eyes: EOM are normal. Pupils are equal, round, and reactive to light.   Cardiovascular: Normal rate and regular rhythm.   Pulmonary/Chest: Effort normal.   Diminished left lower lobe but clear   Abdominal: Soft. Bowel sounds are normal. He exhibits no distension. There is no tenderness. There is no guarding.   Musculoskeletal: He exhibits no edema.   Neurological: He is alert and oriented to person, place, and time.   Skin: Skin is warm and dry. No erythema.   Left chest dressing clean, dry, intact, wound not visualized   Psychiatric: He has a normal mood and affect. His behavior is normal.   Vitals reviewed.    LAB/DIAGNOSTICS:    Lab Results (last 24 hours)     Procedure Component Value Units Date/Time    Procalcitonin " [[id number]]  (Abnormal) Collected:  11/14/18 0614    Specimen:  Blood Updated:  11/14/18 0807     Procalcitonin 1.47 ng/mL     Narrative:       As a Marker for Sepsis (Non-Neonates):   1. <0.5 ng/mL represents a low risk of severe sepsis and/or septic shock.  2. >2 ng/mL represents a high risk of severe sepsis and/or septic shock.    As a Marker for Lower Respiratory Tract Infections that require antibiotic therapy:    PCT on Admission     Antibiotic Therapy       6-12 Hrs later  > 0.5                Strongly Recommended             >0.25 - <0.5         Recommended  0.1 - 0.25           Discouraged              Remeasure/reassess PCT  <0.1                 Strongly Discouraged     Remeasure/reassess PCT                     PCT values of < 0.5 ng/mL do not exclude an infection, because localized infections (without systemic signs) may be associated with such low concentrations, or a systemic infection in its initial stages (< 6 hours). Furthermore, increased PCT can occur without infection. PCT concentrations between 0.5 and 2.0 ng/mL should be interpreted taking into account the patient's history. It is recommended to retest PCT within 6-24 hours if any concentrations < 2 ng/mL are obtained.    Blood Culture - Blood, Arm, Left [[id number]] Collected:  11/14/18 0600    Specimen:  Blood from Arm, Left Updated:  11/14/18 0630    Blood Culture - Blood, Hand, Left [[id number]] Collected:  11/14/18 0620    Specimen:  Blood from Hand, Left Updated:  11/14/18 0630    CBC & Differential [[id number]] Collected:  11/14/18 0409    Specimen:  Blood Updated:  11/14/18 0435    Narrative:       The following orders were created for panel order CBC & Differential.  Procedure                               Abnormality         Status                     ---------                               -----------         ------                     CBC Auto Differential[[id number]]        Abnormal            Final result                 Please view  results for these tests on the individual orders.    CBC Auto Differential [[id number]]  (Abnormal) Collected:  11/14/18 0409    Specimen:  Blood Updated:  11/14/18 0435     WBC 12.06 10*3/mm3      RBC 5.08 10*6/mm3      Hemoglobin 15.9 g/dL      Hematocrit 47.1 %      MCV 92.7 fL      MCH 31.3 pg      MCHC 33.8 g/dL      RDW 12.1 %      RDW-SD 42.3 fl      MPV 11.2 fL      Platelets 150 10*3/mm3      Neutrophil % 82.4 %      Lymphocyte % 7.0 %      Monocyte % 9.6 %      Eosinophil % 0.6 %      Basophil % 0.2 %      Immature Grans % 0.2 %      Neutrophils, Absolute 9.92 10*3/mm3      Lymphocytes, Absolute 0.85 10*3/mm3      Monocytes, Absolute 1.16 10*3/mm3      Eosinophils, Absolute 0.07 10*3/mm3      Basophils, Absolute 0.03 10*3/mm3      Immature Grans, Absolute 0.03 10*3/mm3      nRBC 0.0 /100 WBC     CBC & Differential [[id number]] Collected:  11/13/18 1206    Specimen:  Blood Updated:  11/13/18 1256    Narrative:       The following orders were created for panel order CBC & Differential.  Procedure                               Abnormality         Status                     ---------                               -----------         ------                     Manual Differential[[id number]]          Abnormal            Final result               CBC Auto Differential[[id number]]        Abnormal            Final result                 Please view results for these tests on the individual orders.    CBC Auto Differential [[id number]]  (Abnormal) Collected:  11/13/18 1206    Specimen:  Blood Updated:  11/13/18 1256     WBC 15.60 10*3/mm3      RBC 4.98 10*6/mm3      Hemoglobin 15.7 g/dL      Hematocrit 45.9 %      MCV 92.2 fL      MCH 31.5 pg      MCHC 34.2 g/dL      RDW 12.1 %      RDW-SD 41.1 fl      MPV 10.5 fL      Platelets 175 10*3/mm3     Manual Differential [[id number]]  (Abnormal) Collected:  11/13/18 1206    Specimen:  Blood Updated:  11/13/18 1256     Neutrophil % 85.0 %      Lymphocyte % 4.0 %      Monocyte  % 8.0 %      Bands %  3.0 %      Metamyelocyte % 0.0 %      Myelocyte % 0.0 %      Promyelocyte % 0.0 %      Atypical Lymphocyte % 0.0 %      Blasts % 0.0 %      Plasma Cells % 0.0 %      Prolymphocyte % 0.0 %      Other Cells % 0.0 %      Neutrophils Absolute 13.73 10*3/mm3      Lymphocytes Absolute 0.62 10*3/mm3      Monocytes Absolute 1.25 10*3/mm3      RBC Morphology Normal     WBC Morphology Normal     Platelet Morphology Normal        No orders to display        Xr Chest 1 View    Result Date: 11/13/2018  No pneumothorax following chest tube removal.  This report was finalized on 11/13/2018 1:08 PM by Dr. Sameer Huitron MD.      Xr Chest 1 View    Result Date: 11/12/2018  No pneumothorax. No interval change.  This report was finalized on 11/12/2018 3:25 PM by Dr. Sameer Huitron MD.      Xr Chest Pa & Lateral    Result Date: 11/13/2018  No pneumothorax. There is bibasilar atelectasis today.  This report was finalized on 11/13/2018 8:33 AM by Dr. Sameer Huitron MD.      ASSESSMENT/PLAN:  HTN:   Continue lisinopril daily   Patient notes diagnosed with hypertension in past but never on medication  Continue to control pain  Discussed need to log BP daily and bring to appointment with Dr. Silveira in 1 week, patient agrees to comply      Tobacco abuse: nicotine patch in place, patient counseled, recommend continued cessation  I advised Santy of the risks of continuing to use tobacco, and I provided him with tobacco cessation educational materials in the After Visit Summary.   During this visit, I spent 10 minutes counseling the patient regarding tobacco cessation.     Acute left pneumothorax: management per surgery, resolved, chest tube removed 11/13/18  -Low grade fever/leukocytosis: +sepsis screen but do not feel patient is septic  -Procalcitonin elevation is likely downtrending and is likely elevated due to        inflammation, not infection.  -No sign of pneumonia on CXR, respiratory viral panel  negative  -Add mucinex, continue IS/ambulation (per Dr. Prabhakar does not want acapella     use)  -Atelectasis likely cause of low grade fevers, WBCC trending down, clinically     improved  -Discussed at length with patient and girlfriend, the importance of     ambulation/IS/deep breathing to prevent pneumonia.   -Overnight had low grade fevers again, was given single doses of Vancomycin     and Cefepime  -Recommend short course of augmentin to cover in case of small infection     developing with chest tube done initially at [facility], then again here    RX sent to patient's pharmacy for all above, f/u appt requested 1 week with pcp Dr. Silveira    Thank you for allowing us to participate in the care of your patient, we will sign off. Patient is stable for discharge from medical standpoint.

## 2018-11-14 NOTE — SIGNIFICANT NOTE
Notified Dr. Zuluaga of positive Sepsis screen; fever 100.8, WBC 12.06, , cough, shoulder pain, and Hypertension uncontrolled with PRN medication that is ordered. New orders received.

## 2018-11-14 NOTE — SIGNIFICANT NOTE
Notified Dr. Prabhakar of positive Sepsis screen and orders received from Dr. Zuluaga, no further orders received from Dr. Prabhakar.

## 2018-11-15 ENCOUNTER — READMISSION MANAGEMENT (OUTPATIENT)
Dept: CALL CENTER | Facility: HOSPITAL | Age: 28
End: 2018-11-15

## 2018-11-15 NOTE — OUTREACH NOTE
Prep Survey      Responses   Facility patient discharged from?  [facility]   Is LACE score < 7 ?  No   Is patient eligible?  Yes   Discharge diagnosis  Spontaneous pneumothorax, s/p insertion of Chest tube in ED   Does the patient have one of the following disease processes/diagnoses(primary or secondary)?  Other   Does the patient have Home health ordered?  No   Is there a DME ordered?  No   Comments regarding appointments  See AVS   Prep survey completed?  Yes          Elaine Herrera RN

## 2018-11-16 ENCOUNTER — READMISSION MANAGEMENT (OUTPATIENT)
Dept: CALL CENTER | Facility: HOSPITAL | Age: 28
End: 2018-11-16

## 2018-11-16 NOTE — OUTREACH NOTE
Medical Week 1 Survey      Responses   Facility patient discharged from?  [facility]   Does the patient have one of the following disease processes/diagnoses(primary or secondary)?  Other   Is there a successful TCM telephone encounter documented?  No   Week 1 attempt successful?  No   Unsuccessful attempts  Attempt 1          Yoly Espinosa RN

## 2018-11-19 ENCOUNTER — READMISSION MANAGEMENT (OUTPATIENT)
Dept: CALL CENTER | Facility: HOSPITAL | Age: 28
End: 2018-11-19

## 2018-11-19 LAB
BACTERIA SPEC AEROBE CULT: NORMAL
BACTERIA SPEC AEROBE CULT: NORMAL

## 2018-11-19 NOTE — OUTREACH NOTE
"Medical Week 2 Survey      Responses   Facility patient discharged from?  [facility]   Does the patient have one of the following disease processes/diagnoses(primary or secondary)?  Other   Call start time  1741   Discharge diagnosis  Spontaneous pneumothorax, s/p insertion of Chest tube in ED   Call end time  1745   Meds reviewed with patient/caregiver?  Yes   Is the patient having any side effects they believe may be caused by any medication additions or changes?  No   Has the patient kept scheduled appointments due by today?  Yes   Comments  PT states will see Dr. Prabhakar tomorrow.   Comments  Pt reports pain around the \"area\"    Did the patient receive a copy of their discharge instructions?  Yes   Nursing interventions  Reviewed instructions with patient   What is the patient's perception of their health status since discharge?  Improving   Is the patient/caregiver able to teach back signs and symptoms related to disease process for when to call PCP?  Yes   Is the patient/caregiver able to teach back signs and symptoms related to disease process for when to call 911?  Yes   Is the patient/caregiver able to teach back the hierarchy of who to call/visit for symptoms/problems? PCP, Specialist, Home health nurse, Urgent Care, ED, 911  Yes          Kari Emanuel RN  "

## 2018-11-20 ENCOUNTER — OFFICE VISIT (OUTPATIENT)
Dept: SURGERY | Facility: CLINIC | Age: 28
End: 2018-11-20

## 2018-11-20 DIAGNOSIS — J93.11 PRIMARY SPONTANEOUS PNEUMOTHORAX: Primary | ICD-10-CM

## 2018-11-20 PROCEDURE — 99212 OFFICE O/P EST SF 10 MIN: CPT | Performed by: SURGERY

## 2018-11-20 NOTE — PROGRESS NOTES
1 wk 3 days s/p chest tube insertion, no complaints, still on ABX  He feels well.  He denies any fevers.  He denies any shortness of breath.  His lungs are clear and equal.  His chest tube incisions are healing well the sutures were removed Steri-Strips were applied.  I will see him back when necessary.  I have advised him if he has a recurrent pneumothorax he should likely seek care from a thoracic surgeon because he will likely need a VAT.

## 2018-11-21 LAB
PATHOLOGIST NAME: NORMAL
SERPINA1 GENE MUT ANL BLD/T: NORMAL
SERPINA1 GENE MUT TESTED BLD/T: NORMAL

## 2018-11-26 ENCOUNTER — READMISSION MANAGEMENT (OUTPATIENT)
Dept: CALL CENTER | Facility: HOSPITAL | Age: 28
End: 2018-11-26

## 2018-11-26 NOTE — OUTREACH NOTE
Medical Week 2 Survey      Responses   Facility patient discharged from?  [facility]   Does the patient have one of the following disease processes/diagnoses(primary or secondary)?  Other   Week 2 attempt successful?  No   Unsuccessful attempts  Attempt 1          Shamika Mix RN

## 2019-06-05 ENCOUNTER — HOSPITAL ENCOUNTER (EMERGENCY)
Facility: HOSPITAL | Age: 29
Discharge: HOME | End: 2019-06-05
Attending: EMERGENCY MEDICINE | Admitting: EMERGENCY MEDICINE

## 2019-06-05 VITALS
HEART RATE: 62 BPM | DIASTOLIC BLOOD PRESSURE: 102 MMHG | HEIGHT: 69 IN | WEIGHT: 180 LBS | RESPIRATION RATE: 16 BRPM | BODY MASS INDEX: 26.66 KG/M2 | TEMPERATURE: 98.4 F | SYSTOLIC BLOOD PRESSURE: 145 MMHG | OXYGEN SATURATION: 98 %

## 2019-06-05 DIAGNOSIS — I10 HYPERTENSION, UNSPECIFIED TYPE: ICD-10-CM

## 2019-06-05 DIAGNOSIS — R10.13 EPIGASTRIC ABDOMINAL PAIN: Primary | ICD-10-CM

## 2019-06-05 LAB
ALBUMIN SERPL-MCNC: 4.1 G/DL (ref 3.5–5.2)
ALBUMIN/GLOB SERPL: 1.4 G/DL
ALP SERPL-CCNC: 68 U/L (ref 39–117)
ALT SERPL W P-5'-P-CCNC: 10 U/L (ref 1–41)
ANION GAP SERPL CALCULATED.3IONS-SCNC: 11.9 MMOL/L
AST SERPL-CCNC: 12 U/L (ref 1–40)
BASOPHILS # BLD AUTO: 0.05 10*3/MM3 (ref 0–0.2)
BASOPHILS NFR BLD AUTO: 0.8 % (ref 0–1.5)
BILIRUB SERPL-MCNC: 0.3 MG/DL (ref 0.2–1.2)
BUN BLD-MCNC: 11 MG/DL (ref 6–20)
BUN/CREAT SERPL: 10.5 (ref 7–25)
CALCIUM SPEC-SCNC: 9 MG/DL (ref 8.6–10.5)
CHLORIDE SERPL-SCNC: 104 MMOL/L (ref 98–107)
CO2 SERPL-SCNC: 24.1 MMOL/L (ref 22–29)
CREAT BLD-MCNC: 1.05 MG/DL (ref 0.76–1.27)
DEPRECATED RDW RBC AUTO: 45.7 FL (ref 37–54)
EOSINOPHIL # BLD AUTO: 1.1 10*3/MM3 (ref 0–0.4)
EOSINOPHIL NFR BLD AUTO: 17.9 % (ref 0.3–6.2)
ERYTHROCYTE [DISTWIDTH] IN BLOOD BY AUTOMATED COUNT: 13.3 % (ref 12.3–15.4)
GFR SERPL CREATININE-BSD FRML MDRD: 83 ML/MIN/1.73
GLOBULIN UR ELPH-MCNC: 3 GM/DL
GLUCOSE BLD-MCNC: 103 MG/DL (ref 65–99)
HCT VFR BLD AUTO: 45.7 % (ref 37.5–51)
HGB BLD-MCNC: 15.5 G/DL (ref 13–17.7)
IMM GRANULOCYTES # BLD AUTO: 0.01 10*3/MM3 (ref 0–0.05)
IMM GRANULOCYTES NFR BLD AUTO: 0.2 % (ref 0–0.5)
LIPASE SERPL-CCNC: 16 U/L (ref 13–60)
LYMPHOCYTES # BLD AUTO: 2.25 10*3/MM3 (ref 0.7–3.1)
LYMPHOCYTES NFR BLD AUTO: 36.6 % (ref 19.6–45.3)
MCH RBC QN AUTO: 31.4 PG (ref 26.6–33)
MCHC RBC AUTO-ENTMCNC: 33.9 G/DL (ref 31.5–35.7)
MCV RBC AUTO: 92.5 FL (ref 79–97)
MONOCYTES # BLD AUTO: 0.35 10*3/MM3 (ref 0.1–0.9)
MONOCYTES NFR BLD AUTO: 5.7 % (ref 5–12)
NEUTROPHILS # BLD AUTO: 2.38 10*3/MM3 (ref 1.7–7)
NEUTROPHILS NFR BLD AUTO: 38.8 % (ref 42.7–76)
NRBC BLD AUTO-RTO: 0 /100 WBC (ref 0–0.2)
PLATELET # BLD AUTO: 183 10*3/MM3 (ref 140–450)
PMV BLD AUTO: 10.8 FL (ref 6–12)
POTASSIUM BLD-SCNC: 3.7 MMOL/L (ref 3.5–5.2)
PROT SERPL-MCNC: 7.1 G/DL (ref 6–8.5)
RBC # BLD AUTO: 4.94 10*6/MM3 (ref 4.14–5.8)
SODIUM BLD-SCNC: 140 MMOL/L (ref 136–145)
WBC NRBC COR # BLD: 6.14 10*3/MM3 (ref 3.4–10.8)

## 2019-06-05 PROCEDURE — 83690 ASSAY OF LIPASE: CPT | Performed by: EMERGENCY MEDICINE

## 2019-06-05 PROCEDURE — 99283 EMERGENCY DEPT VISIT LOW MDM: CPT

## 2019-06-05 PROCEDURE — 99282 EMERGENCY DEPT VISIT SF MDM: CPT | Performed by: EMERGENCY MEDICINE

## 2019-06-05 PROCEDURE — 85025 COMPLETE CBC W/AUTO DIFF WBC: CPT | Performed by: EMERGENCY MEDICINE

## 2019-06-05 PROCEDURE — 80053 COMPREHEN METABOLIC PANEL: CPT | Performed by: EMERGENCY MEDICINE

## 2019-06-05 RX ORDER — PANTOPRAZOLE SODIUM 40 MG/1
40 TABLET, DELAYED RELEASE ORAL DAILY
Qty: 30 TABLET | Refills: 0 | Status: SHIPPED | OUTPATIENT
Start: 2019-06-05

## 2019-06-05 RX ORDER — ALUMINA, MAGNESIA, AND SIMETHICONE 2400; 2400; 240 MG/30ML; MG/30ML; MG/30ML
10 SUSPENSION ORAL ONCE
Status: DISCONTINUED | OUTPATIENT
Start: 2019-06-05 | End: 2019-06-05 | Stop reason: HOSPADM

## 2019-06-05 RX ORDER — METOCLOPRAMIDE 10 MG/1
10 TABLET ORAL ONCE
Status: COMPLETED | OUTPATIENT
Start: 2019-06-05 | End: 2019-06-05

## 2019-06-05 RX ORDER — DICYCLOMINE HYDROCHLORIDE 10 MG/1
20 CAPSULE ORAL ONCE
Status: COMPLETED | OUTPATIENT
Start: 2019-06-05 | End: 2019-06-05

## 2019-06-05 RX ORDER — SUCRALFATE ORAL 1 G/10ML
1 SUSPENSION ORAL
Qty: 1200 ML | Refills: 0 | Status: SHIPPED | OUTPATIENT
Start: 2019-06-05 | End: 2019-07-05

## 2019-06-05 RX ORDER — DICYCLOMINE HCL 20 MG
20 TABLET ORAL EVERY 8 HOURS PRN
Qty: 15 TABLET | Refills: 0 | Status: SHIPPED | OUTPATIENT
Start: 2019-06-05

## 2019-06-05 RX ORDER — ALUMINA, MAGNESIA, AND SIMETHICONE 2400; 2400; 240 MG/30ML; MG/30ML; MG/30ML
SUSPENSION ORAL
Status: COMPLETED
Start: 2019-06-05 | End: 2019-06-05

## 2019-06-05 RX ADMIN — DICYCLOMINE HYDROCHLORIDE 20 MG: 10 CAPSULE ORAL at 15:05

## 2019-06-05 RX ADMIN — METOCLOPRAMIDE 10 MG: 10 TABLET ORAL at 14:33

## 2019-06-05 RX ADMIN — ALUMINUM HYDROXIDE, MAGNESIUM HYDROXIDE, AND DIMETHICONE 10 ML: 400; 400; 40 SUSPENSION ORAL at 14:35

## 2019-06-05 NOTE — ED PROVIDER NOTES
Subjective     Abdominal Pain   Pain location:  Epigastric  Pain quality: dull    Pain radiates to:  Back  Pain severity:  Moderate  Onset quality:  Gradual  Duration:  2 weeks  Timing:  Intermittent  Progression:  Waxing and waning  Chronicity:  New  Context comment:  Spont onset  Relieved by:  Nothing  Worsened by:  Nothing  Ineffective treatments:  None tried  Associated symptoms: nausea    Associated symptoms: no constipation, no diarrhea, no fever, no shortness of breath and no vomiting        Review of Systems   Constitutional: Negative for fever.   Respiratory: Negative for shortness of breath.    Gastrointestinal: Positive for abdominal pain and nausea. Negative for constipation, diarrhea and vomiting.   All other systems reviewed and are negative.      Past Medical History:   Diagnosis Date   • Asthma    • Hypertension    • Pneumothorax        No Known Allergies    Past Surgical History:   Procedure Laterality Date   • CHEST TUBE INSERTION Left 11/10/2018    Procedure: CHEST TUBE INSERTION;  Surgeon: Ismael Prabhakar MD;  Location: [facility];  Service: General   • DENTAL PROCEDURE         History reviewed. No pertinent family history.    Social History     Socioeconomic History   • Marital status: Significant Other     Spouse name: Not on file   • Number of children: Not on file   • Years of education: Not on file   • Highest education level: Not on file   Tobacco Use   • Smoking status: Heavy Tobacco Smoker     Packs/day: 1.00     Types: Cigarettes   • Smokeless tobacco: Former User     Types: Chew   Substance and Sexual Activity   • Alcohol use: Yes     Comment: socially    • Drug use: No   • Sexual activity: Defer           Objective   Physical Exam   Constitutional: He is oriented to person, place, and time. He appears well-developed and well-nourished.  Non-toxic appearance. He does not appear ill. No distress.   HENT:   Head: Normocephalic and atraumatic.   Mouth/Throat: Oropharynx is clear and moist.  No oropharyngeal exudate.   Eyes: EOM are normal. Pupils are equal, round, and reactive to light. No scleral icterus.   Cardiovascular: Normal rate, regular rhythm and normal heart sounds. Exam reveals no gallop and no friction rub.   No murmur heard.  Pulmonary/Chest: Effort normal and breath sounds normal. No stridor. No respiratory distress. He has no wheezes. He has no rales.   Abdominal: Soft. Normal appearance, normal aorta and bowel sounds are normal. He exhibits no distension and no ascites. There is tenderness. There is no rebound, no guarding, no tenderness at McBurney's point and negative Carvajal's sign.   Neurological: He is alert and oriented to person, place, and time.   Skin: Skin is warm. Capillary refill takes less than 2 seconds. He is not diaphoretic. No erythema.   Psychiatric: He has a normal mood and affect.   Nursing note and vitals reviewed.      Procedures           ED Course        Reval, appears well, abd soft, ok with plan to f/u pmd and rx today and return for worse          MDM      Final diagnoses:   Epigastric abdominal pain   Hypertension, unspecified type            Jason Qureshi MD  06/05/19 1504       Jason Qureshi MD  06/05/19 1525

## (undated) DEVICE — TOWEL,OR,DSP,ST,BLUE,STD,4/PK,20PK/CS: Brand: MEDLINE

## (undated) DEVICE — SUT SILK 0 FSL 18IN 678G

## (undated) DEVICE — DRP Z/FRICTION 10X16IN

## (undated) DEVICE — GLV SURG EUDERMIC PF LTX 8 STRL

## (undated) DEVICE — OASIS DRAIN, SINGLE, INLINE & ATS COMPATIBLE: Brand: OASIS

## (undated) DEVICE — SYR LL TP 10ML STRL

## (undated) DEVICE — LAG MINOR PROCEDURE: Brand: MEDLINE INDUSTRIES, INC.

## (undated) DEVICE — SPNG GZ WOVN 4X4IN 12PLY 10/BX STRL

## (undated) DEVICE — NDL HYPO PRECISIONGLIDE REG 25G 1 1/2

## (undated) DEVICE — PETROLATUM GAUZE STRIP: Brand: VASELINE

## (undated) DEVICE — TROCAR CATHETER,SHARP TIP: Brand: ARGYLE

## (undated) DEVICE — SUCTION CANISTER, 1000CC,SAFELINER: Brand: DEROYAL